# Patient Record
(demographics unavailable — no encounter records)

---

## 2018-03-26 NOTE — CT
HEAD CT WITHOUT CONTRAST:

 

03/26/2018

 

HISTORY:

Altered mental status.

 

COMPARISON:

None.

 

TECHNIQUE:

Serial axial CT imaging at 5 mm intervals, from the vertex through the skull base, without contrast.

 

FINDINGS:

There is no intracranial hemorrhage, midline shift, or mass effect.  There is mild white matter hypod
ensity, which may signify a degree of small vessel disease.  No intracranial hemorrhage, midline shif
t, or mass effect.  There is polypoid mucosal thickening within the alveolar recess of the right maxi
llary sinus.  The imaged paranasal sinuses/mastoid air cells are otherwise unremarkable.  No displace
d calvarial fracture.

 

IMPRESSION:

No acute findings.

 

POS: [facility]

## 2018-03-26 NOTE — RAD
CHEST ONE VIEW:

 

HISTORY:

Altered mental status.

 

COMPARISON:

Chest, one view, 01/17/2016.

 

FINDINGS:

The lungs are hypoinflated.  Bibasilar atelectasis.  No pneumothorax.  The cardiac silhouette and med
iastinal contours are similar.  No acute osseous abnormality.

 

IMPRESSION:

Lung hypoinflation and atelectasis with elevation of the right hemidiaphragm, chronic.  No acute intr
athoracic abnormality.

 

POS: [facility]

## 2018-03-30 NOTE — HP
PRIMARY CARE PHYSICIAN:  Dr. Gloria Colorado 

 

CHIEF COMPLAINT:  Abnormal labs.

 

HISTORY OF PRESENT ILLNESS:  Mr. Ojeda is a pleasant 57-year-old gentleman who was seen at [facility] on 03/30/2018.  The patient is mumbling, unable to provide any history.  
History was obtained from review of medical chart as well as discussion with the emergency room physi
bradley.

 

Mr. Ojeda was at [facility].  He was found to have elevated CK level and was 
therefore transferred to the emergency room.

 

REVIEW OF SYSTEMS:  Review of systems could not be completed due to patient's noncooperation.

 

PAST MEDICAL HISTORY:  Chronic atrial fibrillation, bipolar disorder, hypertension, diabetes mellitus
 type 2, and schizophrenia.

 

PAST SURGICAL HISTORY:  Significant for appendectomy and tonsillectomy.

 

SOCIAL HISTORY:  He is a resident at [facility].  No history of tobacco us
e, alcohol use or recreational drug use.

 

FAMILY HISTORY:  Unable to obtain.

 

ALLERGIES:  No known drug allergies.

 

CURRENT MEDICATIONS:  Amlodipine 10 mg daily, aspirin 325 mg daily, fluoxetine 10 mg daily, glipizide
 5 mg 2 times a day, hydralazine 12.5 mg 3 times a day, lisinopril 30 mg daily, metformin 1000 mg 2 t
imes a day, pravastatin 80 mg daily, Risperdal 0.5 mg 2 times a day, Plavix 75 mg daily, Zyprexa 10 m
g 2 times a day and Klonopin 2 mg 2 times a day.

 

PHYSICAL EXAMINATION:

GENERAL:  Mr. Ojeda is awake and alert, not in acute distress.

VITAL SIGNS:  Blood pressure is 143/98, pulse is 92, his breathing at rate of 18, and saturating 94% 
on 2 liters of oxygen.  He is afebrile.

EYES:  No scleral icterus.  No conjunctival pallor.

ENT:  Moist mucosal membranes, no oropharyngeal erythema or exudates.

NECK:  Supple, nontender, normal range of movement.  Trachea is midline.

RESPIRATORY:  Accessory muscles of breathing are not active.  Chest wall movements are symmetric bila
terally.

LUNGS:  Clear to auscultation without wheeze, rhonchi or crepitations.

CARDIOVASCULAR:  S1 and S2 are heard, regular.  Peripheral pulses palpable.  No carotid bruit, no per
icardial rub.

ABDOMEN:  Soft, nontender, bowel sounds heard, no hepatomegaly, no splenomegaly.

MUSCULOSKELETAL:  Power is 5/5 in all 4 extremities.

NEUROLOGIC:  Cranial nerves II-XII intact.  Deep tendon reflexes are 2+.

SKIN:  No rashes or subcutaneous nodules.

LYMPHATIC:  No cervical lymphadenopathy.

PSYCHIATRIC:  Normal mood, normal affect.  The patient is oriented to person, place, and time.

 

LABORATORY DATA:  Mr. Ojeda's labs and investigations were reviewed.  He had an electrocardiogram
, which showed atrial fibrillation, no ST changes to suggest an acute coronary syndrome.  He has an u
nremarkable CBC, normal sodium, normal potassium, elevated blood urea nitrogen of 28, normal creatini
ne, elevated AST of 55, normal ALT, normal alkaline phosphatase, normal total bilirubin and elevated 
CK of 1618.  Troponin I is negative x3.

 

ASSESSMENT AND PLAN:  Mr. Ojeda is a pleasant 57-year-old gentleman who was seen at [facility] on 03/30/2018.  His problem list includes:

1.  Rhabdomyolysis:  Review of medical records reveals that he has had rhabdomyolysis in the past as 
well.  He does not have any muscular rigidity on examination today.  He will be admitted to the [facility] for intravenous hydration.  We will hold his statin for now and recheck CK level.

2.  Diabetes mellitus.  Start Accu-Cheks, insulin sliding scale.

3.  Atrial fibrillation.  Stable, continue home medications.

4.  Hypertension:  Monitor vital signs, titrate antihypertensives as needed.

5.  Schizophrenia and bipolar disorder.  Continue home medications.

 

Many thanks for allowing me to participate in your patient's care.  Please feel free to contact me wi
th any questions or concerns.

 

LEVEL OF RISK:  Moderate.  

 

LEVEL OF COMPLEXITY:  Moderate.

## 2018-03-31 NOTE — PDOC.PN
- Subjective


Encounter Start Date: 03/31/18


Encounter Start Time: 15:27





Pt seen for followup re: rhabdomyolysis.  Appears comfortable, mumbling, unable 

to complete ROS.





- Objective


MAR Reviewed: Yes


Vital Signs & Weight: 


 Vital Signs (12 hours)











  Temp Pulse Resp BP Pulse Ox


 


 03/31/18 11:22  98.1 F  80  16  96/61  92 L


 


 03/31/18 08:00  98 F  73  18   94 L


 


 03/31/18 07:16  98 F  73  18  123/82  94 L


 


 03/31/18 04:26  98.7 F  71  20  143/78 H  95








 Weight











Weight                         210 lb 1.608 oz














I&O: 


 











 03/30/18 03/31/18 04/01/18





 06:59 06:59 06:59


 


Intake Total  1961 


 


Balance  1961 











Result Diagrams: 


 03/31/18 04:44





 03/31/18 04:44


Additional Labs: 


 Accuchecks











  03/31/18 03/31/18 03/30/18





  11:27 04:45 20:30


 


POC Glucose  78  82  92














  03/30/18 03/30/18





  19:30 17:48


 


POC Glucose  53 L*  93














Phys Exam





- Physical Examination


Constitutional: NAD


HEENT: moist MMs


Neck: supple


Respiratory: clear to auscultation bilateral


Cardiovascular: RRR


Gastrointestinal: soft


Musculoskeletal: no edema


Neurological: moves all 4 limbs


Lymphatic: no nodes


Psychiatric: normal affect


Skin: no rash





Dx/Plan


(1) Rhabdomyolysis


Code(s): M62.82 - RHABDOMYOLYSIS   Status: Acute   Comment: CK improving.  

Continue IV fluids, recheck CK level.   





(2) Atrial fibrillation


Code(s): I48.91 - UNSPECIFIED ATRIAL FIBRILLATION   Status: Chronic   


Qualifiers: 


   Atrial fibrillation type: chronic   Qualified Code(s): I48.2 - Chronic 

atrial fibrillation   


Comment: stable   





(3) Bipolar disorder


Code(s): F31.9 - BIPOLAR DISORDER, UNSPECIFIED   Status: Chronic   Comment: 

continue home medications   





(4) Diabetes type 2, controlled


Code(s): E11.9 - TYPE 2 DIABETES MELLITUS WITHOUT COMPLICATIONS   Status: 

Chronic   Comment: continue accuchecks, insulin sliding scale   





(5) Hypertension


Code(s): I10 - ESSENTIAL (PRIMARY) HYPERTENSION   Status: Chronic   Comment: 

Monitor vital signs, titrate antihypertensives as needed.   





- Plan





* .


Back to [facility] when CK improves, likely 24-48 hrs.





Review of Systems





- Medications/Allergies


Allergies/Adverse Reactions: 


 Allergies











Allergy/AdvReac Type Severity Reaction Status Date / Time


 


No Known Drug Allergies Allergy   Verified 08/25/15 00:36











Medications: 


 Current Medications





Acetaminophen (Tylenol)  650 mg PO Q4H PRN


   PRN Reason: Headache/Fever or Pain


Acetaminophen (Tylenol)  650 mg MD Q4H PRN


   PRN Reason: Headache/Fever or Pain


Amlodipine Besylate (Norvasc)  10 mg PO DAILY [facility]


   Last Admin: 03/31/18 09:06 Dose:  10 mg


Aspirin (Ecotrin)  325 mg PO DAILY [facility]


   Last Admin: 03/31/18 09:07 Dose:  325 mg


Clonazepam (Klonopin)  2 mg PO BID [facility]


   Last Admin: 03/31/18 09:06 Dose:  2 mg


Clopidogrel Bisulfate (Plavix)  75 mg PO DAILY [facility]


   Last Admin: 03/31/18 09:06 Dose:  75 mg


Dextrose/Water (Dextrose 50%)  25 gm SLOW IVP PRN PRN


   PRN Reason: Hypoglycemia


Fluoxetine HCl (Prozac)  10 mg PO DAILY [facility]


   Last Admin: 03/31/18 09:06 Dose:  10 mg


Glipizide (Glucotrol)  5 mg PO BID-AC [facility]


   Last Admin: 03/31/18 09:06 Dose:  5 mg


Glucagon (Glucagon)  1 mg IM PRN PRN


   PRN Reason: Hypoglycemia


Hydralazine HCl (Apresoline)  12.5 mg PO TID [facility]


   Last Admin: 03/31/18 14:44 Dose:  Not Given


Dextrose/Water (D5w)  1,000 mls @ 0 mls/hr IV .Q0M PRN; As Directed


   PRN Reason: Hypoglycemia


Sodium Chloride (Normal Saline 0.9%)  1,000 mls @ 125 mls/hr IV .Q8H [facility]


   Last Admin: 03/31/18 15:03 Dose:  1,000 mls


Insulin Human Lispro (Humalog)  0 units SC .MILD SLIDING SCALE PRN


   PRN Reason: Mild Correctional Scale


Metformin HCl (Glucophage)  1,000 mg PO BID-WM [facility]


   Last Admin: 03/31/18 09:06 Dose:  1,000 mg


Olanzapine (Zyprexa)  10 mg PO BID [facility]


   Last Admin: 03/31/18 09:08 Dose:  10 mg


Risperidone (Risperidone)  0.5 mg PO BID [facility]


   Last Admin: 03/31/18 09:08 Dose:  0.5 mg

## 2018-04-01 NOTE — PDOC.PN
- Subjective


Encounter Start Date: 04/01/18


Encounter Start Time: 09:00


-: old records requested/rev





Patient seen and examined. No new complaints. No overnight events


pt is sleepy this morning, he is trying to get out of bed, pulls iv line





- Objective


MAR Reviewed: Yes


Vital Signs & Weight: 


 Vital Signs (12 hours)











  Temp Pulse Resp BP Pulse Ox


 


 04/01/18 08:43   90   


 


 04/01/18 08:00  97.9 F  90  16  138/82  94 L


 


 04/01/18 03:50  98.1 F  73  16  148/94 H  93 L


 


 04/01/18 00:19      94 L


 


 04/01/18 00:00  98.0 F  82  16  126/85  92 L








 Weight











Weight                         210 lb 1.608 oz














I&O: 


 











 03/31/18 04/01/18 04/02/18





 06:59 06:59 06:59


 


Intake Total 1961 1949 


 


Output Total  600 


 


Balance 1961 1349 











Result Diagrams: 


 04/01/18 03:52





 04/01/18 03:52


Additional Labs: 


 Accuchecks











  04/01/18 03/31/18 03/31/18





  04:34 19:18 17:22


 


POC Glucose  92  116 H  150 H














  03/31/18 03/31/18





  16:02 11:27


 


POC Glucose  64 L  78














Phys Exam





- Physical Examination


Constitutional: NAD


HEENT: PERRLA, moist MMs, sclera anicteric


Neck: no JVD, supple


Respiratory: no wheezing, no rales, no rhonchi


Cardiovascular: RRR, no significant murmur, no rub


Gastrointestinal: soft, non-tender, no distention, positive bowel sounds


Musculoskeletal: no edema, pulses present


Neurological: moves all 4 limbs


Lymphatic: no nodes


Skin: no rash, normal turgor





Dx/Plan


(1) Hypoglycemia associated with type 2 diabetes mellitus


Code(s): E11.649 - TYPE 2 DIABETES MELLITUS WITH HYPOGLYCEMIA WITHOUT COMA   

Status: Acute   





(2) Rhabdomyolysis


Code(s): M62.82 - RHABDOMYOLYSIS   Status: Acute   Comment: CK improving.  

Continue IV fluids, recheck CK level.   





(3) Bipolar disorder


Code(s): F31.9 - BIPOLAR DISORDER, UNSPECIFIED   Status: Chronic   Comment: 

continue home medications   





(4) Diabetes type 2, controlled


Code(s): E11.9 - TYPE 2 DIABETES MELLITUS WITHOUT COMPLICATIONS   Status: 

Chronic   Comment: continue accuchecks, insulin sliding scale   





(5) Hypertension


Code(s): I10 - ESSENTIAL (PRIMARY) HYPERTENSION   Status: Chronic   Comment: 

Monitor vital signs, titrate antihypertensives as needed.   





(6) Lumbar stenosis with neurogenic claudication


Code(s): M48.06 - SPINAL STENOSIS, LUMBAR REGION * DO NOT USE *   Status: 

Chronic   





(7) Obesity (BMI 30-39.9)


Code(s): E66.9 - OBESITY, UNSPECIFIED   Status: Chronic   





(8) Paroxysmal atrial fibrillation


Code(s): I48.0 - PAROXYSMAL ATRIAL FIBRILLATION   Status: Chronic   





- Plan


cont current plan of care, PT/OT, 





* continue ivf at 75 ml per hour


* medication reviewed as below


* symptomatic treatment.


* will need social work for discharge planning








Review of Systems





- Review of Systems


Other: 





unable to review due to his variable level of mood





- Medications/Allergies


Allergies/Adverse Reactions: 


 Allergies











Allergy/AdvReac Type Severity Reaction Status Date / Time


 


No Known Drug Allergies Allergy   Verified 08/25/15 00:36











Medications: 


 Current Medications





Acetaminophen (Tylenol)  650 mg PO Q4H PRN


   PRN Reason: Headache/Fever or Pain


Hydrocodone Bitart/Acetaminophen (Norco 5/325)  1 tab PO Q4H PRN


   PRN Reason: Moderate Pain (4-6)


Al Hydroxide/Mg Hydroxide (Maalox)  15 ml PO Q4H PRN


   PRN Reason: Heartburn  or Indigestion


Amlodipine Besylate (Norvasc)  10 mg PO DAILY [facility]


   Last Admin: 04/01/18 08:42 Dose:  10 mg


Artificial Tears (Tears Naturale)  0 drop EA EYE PRN PRN


   PRN Reason: Dry Eyes


Aspirin (Aspirin)  325 mg PO DAILY [facility]


   Last Admin: 04/01/18 08:42 Dose:  325 mg


Clonazepam (Klonopin)  2 mg PO BID [facility]


   Last Admin: 04/01/18 08:42 Dose:  2 mg


Clopidogrel Bisulfate (Plavix)  75 mg PO DAILY [facility]


   Last Admin: 04/01/18 08:43 Dose:  75 mg


Dextrose/Water (Dextrose 50%)  25 gm SLOW IVP PRN PRN


   PRN Reason: Hypoglycemia


Famotidine (Pepcid)  20 mg PO BID [facility]


   Last Admin: 04/01/18 08:41 Dose:  20 mg


Fluoxetine HCl (Prozac)  10 mg PO DAILY [facility]


   Last Admin: 04/01/18 08:42 Dose:  10 mg


[facility]  [facility]


Glucagon (Glucagon)  1 mg IM PRN PRN


   PRN Reason: Hypoglycemia


Guaifenesin (Robitussin Sf)  200 mg PO Q4H PRN


   PRN Reason: Cough


Hydralazine HCl (Apresoline)  12.5 mg PO TID [facility]


   Last Admin: 04/01/18 08:43 Dose:  12.5 mg


Hydralazine HCl (Apresoline)  20 mg SLOW IVP Q4H PRN


   PRN Reason: Systolic BP > 180


Dextrose/Water (D5w)  1,000 mls @ 0 mls/hr IV .Q0M PRN; As Directed


   PRN Reason: Hypoglycemia


Sodium Chloride (Normal Saline 0.9%)  1,000 mls @ 125 mls/hr IV .Q8H [facility]


   Last Admin: 04/01/18 04:11 Dose:  1,000 mls


Insulin Human Lispro (Humalog)  0 units SC .MILD SLIDING SCALE PRN


   PRN Reason: Mild Correctional Scale


Lisinopril (Zestril)  30 mg PO DAILY [facility]


   Last Admin: 04/01/18 08:41 Dose:  30 mg


Loperamide HCl (Imodium)  2 mg PO PRN PRN


   PRN Reason: Diarrhea/Loose Stools


Loratadine (Claritin)  10 mg PO DAILYPRN PRN


   PRN Reason: Sinus Symptoms


Lorazepam (Ativan)  1 mg PO TID [facility]


   Last Admin: 04/01/18 08:42 Dose:  1 mg


Magnesium Hydroxide (Milk Of Magnesium)  30 ml PO DAILYPRN PRN


   PRN Reason: Constipation


Melatonin (Melatonin)  3 mg PO HS PRN


   PRN Reason: Insomnia


Metformin [facility] (Glucophage)  [facility]


   Last Admin: 04/01/18 08:42 Dose:  1,000 mg


Mineral Oil/White Petrolatum (Eucerin Cream)  0 gm TOP BIDPRN PRN


   PRN Reason: Dry Skin


Olanzapine (Zyprexa)  10 mg PO BID [facility]


   Last Admin: 04/01/18 08:43 Dose:  10 mg


Ondansetron HCl (Zofran Odt)  4 mg PO Q6H PRN


   PRN Reason: Nausea/Vomiting


Ondansetron HCl (Zofran)  4 mg IVP Q6H PRN


   PRN Reason: Nausea/Vomiting


Phenol (Chloraseptic Spray 180 Ml Bot)  0 ml PO PRN PRN


   PRN Reason: Sore Throat


Risperidone (Risperidone)  0.5 mg PO BID [facility]


   Last Admin: 04/01/18 08:42 Dose:  0.5 mg


Senna (Senokot)  2 tab PO HSPRN PRN


   PRN Reason: Constipation


Sodium Chloride (Ocean Nasal Spray 0.65%)  0 ml EA NARE QIDPRN PRN


   PRN Reason: Nasal Congestion


Sodium Chloride (Flush - Normal Saline)  10 ml IVF Q12HR [facility]


   Last Admin: 04/01/18 08:43 Dose:  Not Given


Sodium Chloride (Flush - Normal Saline)  10 ml IVF PRN PRN


   PRN Reason: Saline Flush


Zolpidem Tartrate (Ambien)  5 mg PO HSPRN PRN


   PRN Reason: Insomnia

## 2018-04-02 NOTE — PDOC.PN
- Subjective


Encounter Start Date: 04/02/18


Encounter Start Time: 09:30





Patient seen and examined. No new complaints. No overnight events





- Objective


MAR Reviewed: Yes


Vital Signs & Weight: 


 Vital Signs (12 hours)











  Temp Pulse Resp BP BP Pulse Ox


 


 04/02/18 08:16     137/86  


 


 04/02/18 08:00  98.6 F  77  20   144/84 H  93 L


 


 04/02/18 07:38  98.6 F  77  20   144/84 H  93 L


 


 04/02/18 04:00  97.7 F  88  20   130/85  94 L


 


 04/02/18 00:17  98.3 F  88  20   116/84  95








 Weight











Weight                         210 lb 1.608 oz














I&O: 


 











 04/01/18 04/02/18 04/03/18





 06:59 06:59 06:59


 


Intake Total 1949 1658 


 


Output Total 600  


 


Balance 1349 1658 











Result Diagrams: 


 04/02/18 04:20





 04/02/18 04:20


Additional Labs: 


 Accuchecks











  04/02/18 04/01/18 04/01/18





  04:39 19:22 16:10


 


POC Glucose  100  172 H  112 H














  04/01/18





  11:06


 


POC Glucose  107














Phys Exam





- Physical Examination


Constitutional: NAD


HEENT: PERRLA, moist MMs, sclera anicteric


Neck: no JVD, supple


Respiratory: no wheezing, no rales, no rhonchi


Cardiovascular: RRR, no significant murmur, no rub


Gastrointestinal: soft, non-tender, no distention, positive bowel sounds


Musculoskeletal: no edema, pulses present


Neurological: non-focal, normal sensation


Lymphatic: no nodes


Psychiatric: normal affect


Skin: no rash, normal turgor





Dx/Plan


(1) Hypoglycemia associated with type 2 diabetes mellitus


Code(s): E11.649 - TYPE 2 DIABETES MELLITUS WITH HYPOGLYCEMIA WITHOUT COMA   

Status: Acute   





(2) Rhabdomyolysis


Code(s): M62.82 - RHABDOMYOLYSIS   Status: Acute   Comment: CK improving.  

Continue IV fluids, recheck CK level.   





(3) Bipolar disorder


Code(s): F31.9 - BIPOLAR DISORDER, UNSPECIFIED   Status: Chronic   Comment: 

continue home medications   





(4) Diabetes type 2, controlled


Code(s): E11.9 - TYPE 2 DIABETES MELLITUS WITHOUT COMPLICATIONS   Status: 

Chronic   Comment: continue accuchecks, insulin sliding scale   





(5) Hypertension


Code(s): I10 - ESSENTIAL (PRIMARY) HYPERTENSION   Status: Chronic   Comment: 

Monitor vital signs, titrate antihypertensives as needed.   





(6) Lumbar stenosis with neurogenic claudication


Code(s): M48.06 - SPINAL STENOSIS, LUMBAR REGION * DO NOT USE *   Status: 

Chronic   





(7) Obesity (BMI 30-39.9)


Code(s): E66.9 - OBESITY, UNSPECIFIED   Status: Chronic   





(8) Paroxysmal atrial fibrillation


Code(s): I48.0 - PAROXYSMAL ATRIAL FIBRILLATION   Status: Chronic   





- Plan


cont current plan of care





* CK improved


* today will call [facility]


* needs to go back to psych facility if they take him


* medication reviewed as below


* symptomatic treatment.








Review of Systems





- Review of Systems


Other: 





not reliable with pt as pt is psychotic





- Medications/Allergies


Allergies/Adverse Reactions: 


 Allergies











Allergy/AdvReac Type Severity Reaction Status Date / Time


 


No Known Drug Allergies Allergy   Verified 08/25/15 00:36











Medications: 


 Current Medications





Acetaminophen (Tylenol)  650 mg PO Q4H PRN


   PRN Reason: Headache/Fever or Pain


Hydrocodone Bitart/Acetaminophen (Norco 5/325)  1 tab PO Q4H PRN


   PRN Reason: Moderate Pain (4-6)


Al Hydroxide/Mg Hydroxide (Maalox)  15 ml PO Q4H PRN


   PRN Reason: Heartburn  or Indigestion


Amlodipine Besylate (Norvasc)  10 mg PO DAILY [facility]


   Last Admin: 04/02/18 08:15 Dose:  10 mg


Artificial Tears (Tears Naturale)  0 drop EA EYE PRN PRN


   PRN Reason: Dry Eyes


Aspirin (Aspirin)  325 mg PO DAILY [facility]


   Last Admin: 04/02/18 08:15 Dose:  325 mg


Clonazepam (Klonopin)  2 mg PO BID [facility]


   Last Admin: 04/02/18 08:15 Dose:  2 mg


Clopidogrel Bisulfate (Plavix)  75 mg PO DAILY [facility]


   Last Admin: 04/02/18 08:16 Dose:  75 mg


Dextrose/Water (Dextrose 50%)  25 gm SLOW IVP PRN PRN


   PRN Reason: Hypoglycemia


Famotidine (Pepcid)  20 mg PO BID [facility]


   Last Admin: 04/02/18 08:15 Dose:  20 mg


Fluoxetine HCl (Prozac)  10 mg PO DAILY [facility]


   Last Admin: 04/02/18 08:15 Dose:  10 mg


Glucagon (Glucagon)  1 mg IM PRN PRN


   PRN Reason: Hypoglycemia


Guaifenesin (Robitussin Sf)  200 mg PO Q4H PRN


   PRN Reason: Cough


Hydralazine HCl (Apresoline)  12.5 mg PO TID [facility]


   Last Admin: 04/02/18 08:15 Dose:  12.5 mg


Hydralazine HCl (Apresoline)  20 mg SLOW IVP Q4H PRN


   PRN Reason: Systolic BP > 180


Dextrose/Water (D5w)  1,000 mls @ 0 mls/hr IV .Q0M PRN; As Directed


   PRN Reason: Hypoglycemia


Sodium Chloride (Normal Saline 0.9%)  1,000 mls @ 75 mls/hr IV .[id number] [facility]


   Last Admin: 04/02/18 01:27 Dose:  1,000 mls


Insulin Human Lispro (Humalog)  0 units SC .MILD SLIDING SCALE PRN


   PRN Reason: Mild Correctional Scale


Lisinopril (Zestril)  30 mg PO DAILY [facility]


   Last Admin: 04/02/18 08:16 Dose:  30 mg


Loperamide HCl (Imodium)  2 mg PO PRN PRN


   PRN Reason: Diarrhea/Loose Stools


Loratadine (Claritin)  10 mg PO DAILYPRN PRN


   PRN Reason: Sinus Symptoms


Lorazepam (Ativan)  1 mg PO TID [facility]


   Last Admin: 04/02/18 08:16 Dose:  1 mg


Magnesium Hydroxide (Milk Of Magnesium)  30 ml PO DAILYPRN PRN


   PRN Reason: Constipation


   Last Admin: 04/02/18 08:29 Dose:  30 ml


Melatonin (Melatonin)  3 mg PO HS PRN


   PRN Reason: Insomnia


Mineral Oil/White Petrolatum (Eucerin Cream)  0 gm TOP BIDPRN PRN


   PRN Reason: Dry Skin


Olanzapine (Zyprexa)  10 mg PO BID [facility]


   Last Admin: 04/02/18 08:14 Dose:  10 mg


Ondansetron HCl (Zofran Odt)  4 mg PO Q6H PRN


   PRN Reason: Nausea/Vomiting


Ondansetron HCl (Zofran)  4 mg IVP Q6H PRN


   PRN Reason: Nausea/Vomiting


Phenol (Chloraseptic Spray 180 Ml Bot)  0 ml PO PRN PRN


   PRN Reason: Sore Throat


Risperidone (Risperidone)  0.5 mg PO BID [facility]


   Last Admin: 04/02/18 08:16 Dose:  0.5 mg


Senna (Senokot)  2 tab PO HSPRN PRN


   PRN Reason: Constipation


Sodium Chloride (Ocean Nasal Spray 0.65%)  0 ml EA NARE QIDPRN PRN


   PRN Reason: Nasal Congestion


Sodium Chloride (Flush - Normal Saline)  10 ml IVF Q12HR [facility]


   Last Admin: 04/02/18 08:16 Dose:  Not Given


Sodium Chloride (Flush - Normal Saline)  10 ml IVF PRN PRN


   PRN Reason: Saline Flush


Zolpidem Tartrate (Ambien)  5 mg PO HSPRN PRN


   PRN Reason: Insomnia

## 2018-04-03 NOTE — PDOC.PN
- Subjective


Encounter Start Date: 04/03/18


Encounter Start Time: 08:00





Patient seen and examined. No new complaints. No overnight events





- Objective


MAR Reviewed: Yes


Vital Signs & Weight: 


 Vital Signs (12 hours)











  Temp Pulse Resp BP BP Pulse Ox


 


 04/03/18 09:12     144/103 H  


 


 04/03/18 09:11   72   144/103 H  


 


 04/03/18 09:10   72   144/103 H  


 


 04/03/18 08:00  97.4 F L  72  18    91 L


 


 04/03/18 07:57  97.4 F L  72  18   144/103 H  91 L


 


 04/03/18 04:00  97.8 F  75  20   133/98 H  93 L








 Weight











Weight                         210 lb 1.608 oz














I&O: 


 











 04/02/18 04/03/18 04/04/18





 06:59 06:59 06:59


 


Intake Total 1658 650 


 


Balance 1658 650 











Result Diagrams: 


 04/02/18 04:20





 04/02/18 04:20


Additional Labs: 


 Accuchecks











  04/03/18 04/03/18 04/02/18





  11:10 04:21 19:51


 


POC Glucose  109  104  155 H














  04/02/18 04/02/18





  16:45 12:18


 


POC Glucose  91  95














Phys Exam





- Physical Examination


Constitutional: NAD


HEENT: PERRLA, moist MMs, sclera anicteric


Neck: no JVD, supple


Respiratory: no wheezing, no rales, no rhonchi


Cardiovascular: RRR, no significant murmur, no rub


Gastrointestinal: soft, non-tender, no distention, positive bowel sounds


Musculoskeletal: no edema, pulses present


Neurological: non-focal, normal sensation, moves all 4 limbs


Lymphatic: no nodes


Psychiatric: normal affect


Skin: no rash, normal turgor





Dx/Plan


(1) Hypoglycemia associated with type 2 diabetes mellitus


Code(s): E11.649 - TYPE 2 DIABETES MELLITUS WITH HYPOGLYCEMIA WITHOUT COMA   

Status: Resolved   





(2) Rhabdomyolysis


Code(s): M62.82 - RHABDOMYOLYSIS   Status: Resolved   Comment: CK improving.  

Continue IV fluids, recheck CK level.   





(3) Bipolar disorder


Code(s): F31.9 - BIPOLAR DISORDER, UNSPECIFIED   Status: Chronic   Comment: 

continue home medications   





(4) Diabetes type 2, controlled


Code(s): E11.9 - TYPE 2 DIABETES MELLITUS WITHOUT COMPLICATIONS   Status: 

Chronic   Comment: continue accuchecks, insulin sliding scale   





(5) Hypertension


Code(s): I10 - ESSENTIAL (PRIMARY) HYPERTENSION   Status: Chronic   Comment: 

Monitor vital signs, titrate antihypertensives as needed.   





(6) Lumbar stenosis with neurogenic claudication


Code(s): M48.06 - SPINAL STENOSIS, LUMBAR REGION * DO NOT USE *   Status: 

Chronic   





(7) Obesity (BMI 30-39.9)


Code(s): E66.9 - OBESITY, UNSPECIFIED   Status: Chronic   





(8) Paroxysmal atrial fibrillation


Code(s): I48.0 - PAROXYSMAL ATRIAL FIBRILLATION   Status: Chronic   





- Plan


cont current plan of care, 





* pt is medically stable for discharge


* he is waiting for [facility] as he needs that and he is psychotic


* medication reviewed as below


* symptomatic treatment


* once [facility] available, will discharge to home.








Review of Systems





- Review of Systems


Other: 





not reliable due to his cognitive status





- Medications/Allergies


Allergies/Adverse Reactions: 


 Allergies











Allergy/AdvReac Type Severity Reaction Status Date / Time


 


No Known Drug Allergies Allergy   Verified 08/25/15 00:36











Medications: 


 Current Medications





Acetaminophen (Tylenol)  650 mg PO Q4H PRN


   PRN Reason: Headache/Fever or Pain


   Last Admin: 04/02/18 12:49 Dose:  650 mg


Hydrocodone Bitart/Acetaminophen (Norco 5/325)  1 tab PO Q4H PRN


   PRN Reason: Moderate Pain (4-6)


Al Hydroxide/Mg Hydroxide (Maalox)  15 ml PO Q4H PRN


   PRN Reason: Heartburn  or Indigestion


Amlodipine Besylate (Norvasc)  10 mg PO DAILY [facility]


   Last Admin: 04/03/18 09:10 Dose:  10 mg


Artificial Tears (Tears Naturale)  0 drop EA EYE PRN PRN


   PRN Reason: Dry Eyes


Aspirin (Aspirin)  325 mg PO DAILY [facility]


   Last Admin: 04/03/18 09:10 Dose:  325 mg


Clonazepam (Klonopin)  2 mg PO BID [facility]


   Last Admin: 04/03/18 09:10 Dose:  2 mg


Clopidogrel Bisulfate (Plavix)  75 mg PO DAILY [facility]


   Last Admin: 04/03/18 09:14 Dose:  75 mg


Dextrose/Water (Dextrose 50%)  25 gm SLOW IVP PRN PRN


   PRN Reason: Hypoglycemia


Famotidine (Pepcid)  20 mg PO BID [facility]


   Last Admin: 04/03/18 09:11 Dose:  20 mg


Fluoxetine HCl (Prozac)  10 mg PO DAILY [facility]


   Last Admin: 04/03/18 09:11 Dose:  10 mg


Glucagon (Glucagon)  1 mg IM PRN PRN


   PRN Reason: Hypoglycemia


Guaifenesin (Robitussin Sf)  200 mg PO Q4H PRN


   PRN Reason: Cough


Hydralazine HCl (Apresoline)  12.5 mg PO TID [facility]


   Last Admin: 04/03/18 09:11 Dose:  12.5 mg


Hydralazine HCl (Apresoline)  20 mg SLOW IVP Q4H PRN


   PRN Reason: Systolic BP > 180


Dextrose/Water (D5w)  1,000 mls @ 0 mls/hr IV .Q0M PRN; As Directed


   PRN Reason: Hypoglycemia


Insulin Human Lispro (Humalog)  0 units SC .MILD SLIDING SCALE PRN


   PRN Reason: Mild Correctional Scale


Lisinopril (Zestril)  30 mg PO DAILY [facility]


   Last Admin: 04/03/18 09:12 Dose:  30 mg


Loperamide HCl (Imodium)  2 mg PO PRN PRN


   PRN Reason: Diarrhea/Loose Stools


Loratadine (Claritin)  10 mg PO DAILYPRN PRN


   PRN Reason: Sinus Symptoms


Lorazepam (Ativan)  1 mg PO TID [facility]


   Last Admin: 04/03/18 09:13 Dose:  1 mg


Magnesium Hydroxide (Milk Of Magnesium)  30 ml PO DAILYPRN PRN


   PRN Reason: Constipation


   Last Admin: 04/02/18 08:29 Dose:  30 ml


Melatonin (Melatonin)  3 mg PO HS PRN


   PRN Reason: Insomnia


Mineral Oil/White Petrolatum (Eucerin Cream)  0 gm TOP BIDPRN PRN


   PRN Reason: Dry Skin


Olanzapine (Zyprexa)  10 mg PO BID [facility]


   Last Admin: 04/03/18 09:14 Dose:  10 mg


Ondansetron HCl (Zofran Odt)  4 mg PO Q6H PRN


   PRN Reason: Nausea/Vomiting


Ondansetron HCl (Zofran)  4 mg IVP Q6H PRN


   PRN Reason: Nausea/Vomiting


Phenol (Chloraseptic Spray 180 Ml Bot)  0 ml PO PRN PRN


   PRN Reason: Sore Throat


Risperidone (Risperidone)  0.5 mg PO BID [facility]


   Last Admin: 04/03/18 09:58 Dose:  0.5 mg


Senna (Senokot)  2 tab PO HSPRN PRN


   PRN Reason: Constipation


Sodium Chloride (Ocean Nasal Spray 0.65%)  0 ml EA NARE QIDPRN PRN


   PRN Reason: Nasal Congestion


Sodium Chloride (Flush - Normal Saline)  10 ml IVF Q12HR [facility]


   Last Admin: 04/03/18 09:15 Dose:  10 ml


Sodium Chloride (Flush - Normal Saline)  10 ml IVF PRN PRN


   PRN Reason: Saline Flush


Zolpidem Tartrate (Ambien)  5 mg PO HSPRN PRN


   PRN Reason: Insomnia

## 2018-04-03 NOTE — PDOC.EVN
Event Note





- Event Note


Event Note: 





Bed free at the inpatient psychiatri c facility, report calleed to Dr Alexandre, pt 

accepted.  Transferred to [facility]





D/C summary per Dr Figueroa

## 2018-04-04 NOTE — PDOC.PN
- Subjective


Encounter Start Date: 04/04/18


Encounter Start Time: 06:00





Patient seen and examined. No new complaints. No overnight events





- Objective


MAR Reviewed: Yes


Vital Signs & Weight: 


 Vital Signs (12 hours)











  Temp Pulse Resp BP BP Pulse Ox


 


 04/04/18 04:30  97.7 F  89  20   142/90 H  95


 


 04/04/18 00:52  97.6 F  69  18   123/90  94 L


 


 04/03/18 20:48   79   137/91 H  


 


 04/03/18 20:00  98.1 F  77  20   137/91 H  94 L








 Weight











Weight                         210 lb 1.608 oz














I&O: 


 











 04/02/18 04/03/18 04/04/18





 06:59 06:59 06:59


 


Intake Total 7328 [id number] [id number]


 


Balance 8204 824 3976











Result Diagrams: 


 04/02/18 04:20





 04/02/18 04:20


Additional Labs: 


 Accuchecks











  04/04/18 04/03/18 04/03/18





  05:44 19:32 15:55


 


POC Glucose  146 H  147 H  91














  04/03/18





  11:10


 


POC Glucose  109














Phys Exam





- Physical Examination


Constitutional: NAD


HEENT: PERRLA, moist MMs, sclera anicteric


Neck: no JVD, supple


Respiratory: no wheezing, no rales, no rhonchi


Cardiovascular: RRR, no significant murmur, no rub


Gastrointestinal: soft, non-tender, no distention, positive bowel sounds


Musculoskeletal: no edema, pulses present


Neurological: non-focal, normal sensation


Lymphatic: no nodes


Psychiatric: normal affect


Skin: no rash, normal turgor





Dx/Plan


(1) Hypoglycemia associated with type 2 diabetes mellitus


Code(s): E11.649 - TYPE 2 DIABETES MELLITUS WITH HYPOGLYCEMIA WITHOUT COMA   

Status: Resolved   





(2) Rhabdomyolysis


Code(s): M62.82 - RHABDOMYOLYSIS   Status: Resolved   Comment: CK improving.  

Continue IV fluids, recheck CK level.   





(3) Bipolar disorder


Code(s): F31.9 - BIPOLAR DISORDER, UNSPECIFIED   Status: Chronic   Comment: 

continue home medications   





(4) Diabetes type 2, controlled


Code(s): E11.9 - TYPE 2 DIABETES MELLITUS WITHOUT COMPLICATIONS   Status: 

Chronic   Comment: continue accuchecks, insulin sliding scale   





(5) Hypertension


Code(s): I10 - ESSENTIAL (PRIMARY) HYPERTENSION   Status: Chronic   Comment: 

Monitor vital signs, titrate antihypertensives as needed.   





(6) Lumbar stenosis with neurogenic claudication


Code(s): M48.06 - SPINAL STENOSIS, LUMBAR REGION * DO NOT USE *   Status: 

Chronic   





(7) Obesity (BMI 30-39.9)


Code(s): E66.9 - OBESITY, UNSPECIFIED   Status: Chronic   





(8) Paroxysmal atrial fibrillation


Code(s): I48.0 - PAROXYSMAL ATRIAL FIBRILLATION   Status: Chronic   





- Plan


cont current plan of care





* medication reviewed as below


* symptomatic treatment


* plan for discharge to psych facility.








Review of Systems





- Review of Systems


Eyes: negative: Pain, Vision Change, Conjunctivae Inflammation, Eyelid 

Inflammation, Redness, Other


ENT: negative: Ear Pain, Ear Discharge, Nose Pain, Nose Discharge, Nose 

Congestion, Mouth Pain, Mouth Swelling, Throat Pain, Throat Swelling, Other


Respiratory: negative: Cough, Dry, Shortness of Breath, Hemoptysis, SOB with 

Excertion, Pleuritic Pain, Sputum, Wheezing


Cardiovascular: negative: chest pain, palpitations, orthopnea, paroxysmal 

nocturnal dyspnea, edema, light headedness, other


Gastrointestinal: negative: Nausea, Vomiting, Abdominal Pain, Diarrhea, 

Constipation, Melena, Hematochezia, Other


Genitourinary: negative: Dysuria, Frequency, Incontinence, Hematuria, Retention

, Other


Musculoskeletal: negative: Neck Pain, Shoulder Pain, Arm Pain, Back Pain, Hand 

Pain, Leg Pain, Foot Pain, Other





- Medications/Allergies


Allergies/Adverse Reactions: 


 Allergies











Allergy/AdvReac Type Severity Reaction Status Date / Time


 


No Known Drug Allergies Allergy   Verified 08/25/15 00:36











Medications: 


 Current Medications





Acetaminophen (Tylenol)  650 mg PO Q4H PRN


   PRN Reason: Headache/Fever or Pain


   Last Admin: 04/02/18 12:49 Dose:  650 mg


Hydrocodone Bitart/Acetaminophen (Norco 5/325)  1 tab PO Q4H PRN


   PRN Reason: Moderate Pain (4-6)


Al Hydroxide/Mg Hydroxide (Maalox)  15 ml PO Q4H PRN


   PRN Reason: Heartburn  or Indigestion


Amlodipine Besylate (Norvasc)  10 mg PO DAILY [facility]


   Last Admin: 04/03/18 09:10 Dose:  10 mg


Artificial Tears (Tears Naturale)  0 drop EA EYE PRN PRN


   PRN Reason: Dry Eyes


Aspirin (Aspirin)  325 mg PO DAILY [facility]


   Last Admin: 04/03/18 09:10 Dose:  325 mg


Clonazepam (Klonopin)  2 mg PO BID [facility]


   Last Admin: 04/03/18 20:48 Dose:  2 mg


Clopidogrel Bisulfate (Plavix)  75 mg PO DAILY [facility]


   Last Admin: 04/03/18 09:14 Dose:  75 mg


Dextrose/Water (Dextrose 50%)  25 gm SLOW IVP PRN PRN


   PRN Reason: Hypoglycemia


Famotidine (Pepcid)  20 mg PO BID [facility]


   Last Admin: 04/03/18 20:49 Dose:  20 mg


Fluoxetine HCl (Prozac)  10 mg PO DAILY [facility]


   Last Admin: 04/03/18 09:11 Dose:  10 mg


Glucagon (Glucagon)  1 mg IM PRN PRN


   PRN Reason: Hypoglycemia


Guaifenesin (Robitussin Sf)  200 mg PO Q4H PRN


   PRN Reason: Cough


Hydralazine HCl (Apresoline)  12.5 mg PO TID [facility]


   Last Admin: 04/03/18 20:48 Dose:  12.5 mg


Hydralazine HCl (Apresoline)  20 mg SLOW IVP Q4H PRN


   PRN Reason: Systolic BP > 180


Dextrose/Water (D5w)  1,000 mls @ 0 mls/hr IV .Q0M PRN; As Directed


   PRN Reason: Hypoglycemia


Insulin Human Lispro (Humalog)  0 units SC .MILD SLIDING SCALE PRN


   PRN Reason: Mild Correctional Scale


Lisinopril (Zestril)  30 mg PO DAILY [facility]


   Last Admin: 04/03/18 09:12 Dose:  30 mg


Loperamide HCl (Imodium)  2 mg PO PRN PRN


   PRN Reason: Diarrhea/Loose Stools


Loratadine (Claritin)  10 mg PO DAILYPRN PRN


   PRN Reason: Sinus Symptoms


Lorazepam (Ativan)  1 mg PO TID [facility]


   Last Admin: 04/03/18 20:49 Dose:  1 mg


Magnesium Hydroxide (Milk Of Magnesium)  30 ml PO DAILYPRN PRN


   PRN Reason: Constipation


   Last Admin: 04/02/18 08:29 Dose:  30 ml


Melatonin (Melatonin)  3 mg PO HS PRN


   PRN Reason: Insomnia


Mineral Oil/White Petrolatum (Eucerin Cream)  0 gm TOP BIDPRN PRN


   PRN Reason: Dry Skin


Olanzapine (Zyprexa)  10 mg PO BID [facility]


   Last Admin: 04/03/18 22:06 Dose:  10 mg


Ondansetron HCl (Zofran Odt)  4 mg PO Q6H PRN


   PRN Reason: Nausea/Vomiting


Ondansetron HCl (Zofran)  4 mg IVP Q6H PRN


   PRN Reason: Nausea/Vomiting


Phenol (Chloraseptic Spray 180 Ml Bot)  0 ml PO PRN PRN


   PRN Reason: Sore Throat


Risperidone (Risperidone)  0.5 mg PO BID [facility]


   Last Admin: 04/03/18 22:05 Dose:  0.5 mg


Senna (Senokot)  2 tab PO HSPRN PRN


   PRN Reason: Constipation


Sodium Chloride (Ocean Nasal Spray 0.65%)  0 ml EA NARE QIDPRN PRN


   PRN Reason: Nasal Congestion


Sodium Chloride (Flush - Normal Saline)  10 ml IVF Q12HR [facility]


   Last Admin: 04/03/18 20:54 Dose:  10 ml


Sodium Chloride (Flush - Normal Saline)  10 ml IVF PRN PRN


   PRN Reason: Saline Flush


Zolpidem Tartrate (Ambien)  5 mg PO HSPRN PRN


   PRN Reason: Insomnia

## 2018-04-07 NOTE — EKG
Test Reason : 

Blood Pressure : ***/*** mmHG

Vent. Rate : 097 BPM     Atrial Rate : 053 BPM

   P-R Int : 000 ms          QRS Dur : 082 ms

    QT Int : 340 ms       P-R-T Axes : 000 037 043 degrees

   QTc Int : 431 ms

 

Atrial fibrillation

RSR' or QR pattern in V1 suggests right ventricular conduction delay

Septal infarct , age undetermined

Abnormal ECG

 

Confirmed by MONIE ADAM, ELIJAH ([id number]),  JONI HALLMAN ([id number]) on 4/7/2018 10:58:05 PM

 

Referred By:             Confirmed By:ELIJAH LOMAX MD

## 2018-04-14 NOTE — RAD
SINGLE VIEW OF THE CHEST:

4/14/18

 

COMPARISON:  

3/26/18

 

HISTORY: 

Altered mental status with abdominal pain.

 

FINDINGS:

Single view of the chest shows a normal sized cardiomediastinal silhouette. There is no evidence of c
onsolidation, mass, or pleural effusion. The bones are unremarkable.

 

IMPRESSION:

No evidence of acute cardiopulmonary disease.

 

POS: [facility]

## 2018-04-14 NOTE — CT
CT OF THE BRAIN WITHOUT CONTRAST:

4/14/18

 

COMPARISON:  

None.

 

HISTORY: 

Altered mental status. History of schizophrenia and bipolar disorder.

 

TECHNIQUE:  

Multiple contiguous axial images were obtained in a CT of the brain without contrast. 

 

FINDINGS:  

The brain is normal in morphology and attenuation without focal lesions or confluent areas of infarct
ion. There is no evidence of hydrocephalus, intracranial hemorrhage or extra-axial fluid collection. 


 

The calvarium and overlying soft tissues are unremarkable. The visualized paranasal sinuses and masto
id air cells are well aerated. 

 

IMPRESSION:  

No evidence of acute intracranial abnormality. 

 

POS: [facility]

## 2018-04-14 NOTE — CT
CT OF THE ABDOMEN AND PELVIS WITH CONTRAST:

4/14/18

 

COMPARISON:  

None.

 

HISTORY: 

Right sided abdominal pain. Patient has schizophrenic and bipolar disorder.

 

TECHNIQUE:  

Multiple contiguous axial images were obtained in a CT of the abdomen and pelvis with contrast. Coron
al reformats were performed. 

 

FINDINGS:  

There is a 4.4 cm cyst in the left kidney. The liver, gallbladder, right kidney, adrenal glands, sple
en, and pancreas are unremarkable. No free air, free fluid or stranding changes are seen in the abdom
en or pelvis. 

 

The large and small bowel are unremarkable. The appendix is normal. No abdominal or pelvic lymphadeno
sita are seen. Degenerative changes are seen in the spine. The visualized inferior thorax and abdomi
nal wall soft tissues are unremarkable. 

 

IMPRESSION:  

1.      No evidence of acute intra-abdominal/pelvic abnormality. 

2.      Left renal cyst. 

 

POS: [facility]

## 2018-04-14 NOTE — HP
PRIMARY CARE PHYSICIAN:  City call admission.

 

REASON FOR ADMISSION:  Transfer from [facility] for rhabdomyolysis.

 

HISTORY OF PRESENT ILLNESS:  A 57-year-old  male who has underlying schizophrenia, who was re
cently admitted in our hospital on 03/30/2018 and he was discharged to [facility] on 04/04/2018.  During that admission, we treated him for rhabdomyolysis.  Unfortunately, thi
s patient is schizophrenic and he is talking irrelevantly and he is not able to provide any reliable 
history, so history from him is not obtained.

 

At [facility], they checked total CK, it was elevated.  In the emergency r
oom, the patient was given IV fluid.  The patient's renal function was normal.  The patient was tried
 to send him back to [facility], but they refused to take him and that is 
why patient is being admitted in our hospital for observation.

 

REVIEW OF SYSTEMS:  All review of systems tried to review with the patient, but unable to review cari
use of his cognitive status, underlying schizophrenia and he is completely unreliable.

 

ALLERGIES:  No known drug allergy.

 

CURRENT HOME MEDICATIONS:  Amlodipine 10 mg p.o. daily, glipizide 5 mg p.o. b.i.d., lisinopril 30 mg 
p.o. daily, metformin 1000 mg p.o. b.i.d., risperidone 0.5 mg twice daily, Plavix 75 mg p.o. daily, D
epakote 250 mg p.o. b.i.d., Pepcid 20 mg twice daily, Remeron 15 mg p.o. daily, perphenazine 2 mg twi
ce daily.

 

PAST MEDICAL HISTORY:  Chronic atrial fibrillation, hypertension, diabetes type 2.

 

PAST PSYCHIATRIC HISTORY:  Bipolar disorder, schizophrenia.

 

PAST SURGICAL HISTORY:  Appendicectomy, tonsillectomy.

 

SOCIAL HISTORY:  The patient is transferred from [facility].  No history o
f tobacco, alcohol or illicit drug abuse.

 

FAMILY HISTORY:  No strong family history of premature coronary artery disease, stroke or cancer as p
er our medical record.

 

EMERGENCY ROOM COURSE:  The patient has received IV fluid 3 liter.

 

PHYSICAL EXAMINATION:

VITAL SIGNS:  On arrival, blood pressure 118/80, pulse 83 irregular, respiratory rate 17, temperature
 99.3, saturation 99% on room air, weight 90.7 kilograms.

GENERAL:  The patient is currently awake, talking irrelevantly, confused, psychotic.

HEAD:  Normocephalic, atraumatic.

EYES:  Pupils are round, reactive to light.  Extraocular muscle intact.

ENT:  Oropharynx within normal limits.  Moist mucous membranes.  No oral lesion, no pharyngeal erythe
ma, no exudate.

NECK:  Supple, no JVD, no thyromegaly, no carotid bruit, no jugular venous distention.

LUNGS:  Clear to auscultation without any rhonchi or rales.

CARDIAC:  S1, S2 irregularly irregular.  No murmur elicited, no gallop, no rub.

ABDOMEN:  Soft, bowel sounds present, nontender, nondistended.  No organomegaly, no mass, no suprapub
ic tenderness.

BACK:  Unremarkable, no CVA tenderness.

EXTREMITIES:  Upper extremity:  Passive movement of all joints are normal.  Lower extremity:  Trace p
edal edema noted.  No calf tenderness.  Good distal pulsation.

SKIN:  No skin rash.

HEMATOLOGICAL SYSTEM:  No lymphadenopathy.

PSYCHIATRIC:  The patient is psychotic at this point, talking irrelevantly and incoherently.

NEUROLOGIC:  No focal neurological deficit noted.  He moves all 4 limbs.  Detailed neurological exami
[facility] is not possible because of uncooperativeness.

 

SIGNIFICANT LABORATORY DATA:  EKG showing atrial fibrillation with controlled ventricular response.  
CT brain based on my review, no acute intracranial process.  CT of the abdomen and pelvis showed no e
vidence of intra-abdominal process.  Chest x-ray based on my review, no acute cardiopulmonary process
.  CBC:  WBC 7.4, hemoglobin 13.9, platelet 229.  BMP:  Sodium 140, potassium 4.0, chloride 106, carb
on dioxide 22, anion gap 16, BUN 20, creatinine 0.98, glucose 106, calcium 9.5.  LFT:  AST 65, ALT 57
, alkaline phosphatase 92, albumin 4.0.  CK 1783, CK-MB 9.1, troponin I less than 0.010.  Ammonia lev
el 40.  Lactic acid 3.3 and then 1.8.  Urinalysis unremarkable.  Urine drug screen negative.

 

ASSESSMENT AND PLAN:

IMPRESSION:

1.  Rhabdomyolysis.  Looking at his old record, the patient has fluctuating level of total CK, suspec
ting from his psychiatric medication, contributing to elevated total CK.  The patient's renal functio
n is normal.  The patient has received IV fluid.  The patient is currently asymptomatic from this per
spective and because of total CK elevated and that is why suspecting his LFT abnormal as well as CK-M
B is elevated.

2.  Chronic atrial fibrillation, rate controlled.  The patient is not a candidate for chronic anticoa
gulation therapy because of his cognitive status.  He will benefit from only aspirin therapy and his 
CHADS2 score is also very low.

3.  Diabetes, type 2.  We will continue glipizide 5 mg p.o. b.i.d., metformin 1000 mg p.o. b.i.d. and
 insulin as per sliding scale per protocol.

4.  Hypertension.  We will continue lisinopril 30 mg p.o. daily, amlodipine 10 mg p.o. daily.

5.  Anxiety, depression, and bipolar disorder.  At this point, we will continue patient medication, D
epakote 250 mg twice daily, risperidone 0.5 mg twice daily and perphenazine 2 mg twice daily, and Rem
brian 15 mg p.o. daily.

6.  Obesity.  Dietary education given.

7.  Lactic acidosis, may be related with metformin, but currently is improved.  He does not have any 
source of infection.

8.  Transaminitis, likely related with rhabdomyolysis.  We will check his hepatitis profile tomorrow 
morning.

9.  Deep venous thrombosis prophylaxis, Lovenox 40 mg subcu daily.

10.  Gastrointestinal prophylaxis, Pepcid 20 mg p.o. b.i.d.

11.  Code status:  The patient is FULL CODE.  The patient does not have any surrogate decision maker.


 

Disposition plan based on clinical course.  The patient will need [facility] evaluation and again psychiatr
ic placement.

## 2018-04-15 NOTE — PDOC.PN
- Subjective


Encounter Start Date: 04/15/18


Encounter Start Time: 07:40





Pt seen for followup re: rhabdomyolysis.  Mumbling, unable to answer questions, 

unable to complete ROS.





- Objective


Resuscitation Status: 


 











Resuscitation Status           FULL:Full Resuscitation














MAR Reviewed: Yes


Vital Signs & Weight: 


 Vital Signs (12 hours)











  Temp Pulse Resp BP Pulse Ox


 


 04/15/18 04:00  98.1 F  73  17  103/65  96


 


 04/15/18 02:49      99











I&O: 


 











 04/14/18 04/15/18 04/16/18





 06:59 06:59 06:59


 


Intake Total  375 


 


Output Total  300 


 


Balance  75 











Result Diagrams: 


 04/15/18 04:53





 04/15/18 04:53


Additional Labs: 


 Accuchecks











  04/15/18





  06:27


 


POC Glucose  95











EKG Reviewed by me: Yes (Tele:  demetris frank)





Phys Exam





- Physical Examination


Obese


HEENT: PERRLA, moist MMs, sclera anicteric, oral pharynx no lesions


Neck: no nodes, no JVD, supple, full ROM


Respiratory: no wheezing, no rales, no rhonchi, clear to auscultation bilateral


Cardiovascular: no rub, irregular


Gastrointestinal: soft, non-tender, no distention, positive bowel sounds


Neurological: moves all 4 limbs


Psychiatric: normal affect





Dx/Plan


(1) Rhabdomyolysis


Code(s): M62.82 - RHABDOMYOLYSIS   Status: Acute   Comment: Continue IV fluids, 

follow CK levels.  Etiology unclear at this time.   





(2) Diabetes type 2, controlled


Code(s): E11.9 - TYPE 2 DIABETES MELLITUS WITHOUT COMPLICATIONS   Status: 

Chronic   Comment: On accuchecks, insulin sliding scale   





(3) Hypertension


Code(s): I10 - ESSENTIAL (PRIMARY) HYPERTENSION   Status: Chronic   Comment: 

Monitor vital signs, titrate antihypertensives as needed.   





(4) Obesity (BMI 30-39.9)


Code(s): E66.9 - OBESITY, UNSPECIFIED   Status: Chronic   Comment: stable   





(5) Paroxysmal atrial fibrillation


Code(s): I48.0 - PAROXYSMAL ATRIAL FIBRILLATION   Status: Chronic   Comment: 

Rate-controlled.   





(6) Bipolar disorder


Code(s): F31.9 - BIPOLAR DISORDER, UNSPECIFIED   Status: Chronic   Comment: 

Resume home medications   





- Plan





* .


Discontinue telemetry monitor, transfer to medical floor.





Review of Systems





- Medications/Allergies


Allergies/Adverse Reactions: 


 Allergies











Allergy/AdvReac Type Severity Reaction Status Date / Time


 


No Known Drug Allergies Allergy   Verified 08/25/15 00:36











Medications: 


 Current Medications





Acetaminophen (Tylenol)  650 mg PO Q4H PRN


   PRN Reason: Headache/Fever or Pain


   Last Admin: 04/15/18 09:21 Dose:  650 mg


Acetaminophen (Tylenol Er (8hr Arthritis Pain))  650 mg PO Q4H PRN


   PRN Reason: Pain


Hydrocodone Bitart/Acetaminophen (Norco 5/325)  1 tab PO Q4H PRN


   PRN Reason: Moderate Pain (4-6)


Al Hydroxide/Mg Hydroxide (Maalox)  30 ml PO Q6H PRN


   PRN Reason: Heartburn  or Indigestion


Al Hydroxide/Mg Hydroxide (Maalox Plus)  30 ml PO Q4HR PRN


   PRN Reason: GI Upset


Amlodipine Besylate (Norvasc)  10 mg PO DAILY [facility]


Clonidine (Catapres)  0.1 mg PO TID PRN


   PRN Reason: Hypertension


Clopidogrel Bisulfate (Plavix)  75 mg PO DAILY [facility]


Dextrose/Water (Dextrose 50%)  25 gm SLOW IVP PRN PRN


   PRN Reason: Hypoglycemia


Divalproex Sodium (Depakote Er)  500 mg PO HS [facility]


Divalproex Sodium (Depakote Er)  250 mg PO DAILY [facility]


Docusate Sodium (Colace)  100 mg PO DAILY PRN


   PRN Reason: Constipation


Enoxaparin Sodium (Lovenox)  40 mg SC 0900 [facility]


   Last Admin: 04/15/18 09:21 Dose:  40 mg


Famotidine (Pepcid)  20 mg PO BID [facility]


   Last Admin: 04/15/18 09:21 Dose:  20 mg


Famotidine (Pepcid)  20 mg PO BID [facility]


Fluoxetine HCl (Prozac)  10 mg PO DAILY [facility]


Glipizide (Glucotrol)  5 mg PO DAILY [facility]


Glucagon (Glucagon)  1 mg IM PRN PRN


   PRN Reason: Hypoglycemia


Hydroxyzine Pamoate (Vistaril)  50 mg PO HS PRN


   PRN Reason: Insomnia


Dextrose/Water (D5w)  1,000 mls @ 0 mls/hr IV .Q0M PRN; As Directed


   PRN Reason: Hypoglycemia


Sodium Chloride (Normal Saline 0.9%)  1,000 mls @ 75 mls/hr IV .[id number] [facility]


   Last Admin: 04/15/18 01:39 Dose:  1,000 mls


Insulin Human Lispro (Humalog)  0 units SC .MODERATE SLIDING SC PRN


   PRN Reason: Moderate Correctional Scale


Insulin Human Lispro (Humalog)  0 units SC .BEDTIME SLIDING SC PRN


   PRN Reason: Bedtime Correctional Scale


Lisinopril (Zestril)  30 mg PO DAILY [facility]


Loperamide HCl (Imodium)  2 mg PO PRN PRN


   PRN Reason: Diarrhea/Loose Stools


Loratadine (Claritin)  10 mg PO DAILY [facility]


Magnesium Hydroxide (Milk Of Magnesium)  30 ml PO DAILYPRN PRN


   PRN Reason: Constipation


Magnesium Hydroxide (Milk Of Magnesium)  30 ml PO Q4H PRN


   PRN Reason: Constipation


Metformin HCl (Glucophage)  1,000 mg PO BID [facility]


Mirtazapine (Remeron)  15 mg PO HS [facility]


Non-Formulary Medication (Risperidone [Risperdal])  0.5 mg PO DAILY [facility]


Ondansetron HCl (Zofran Odt)  4 mg PO Q6H PRN


   PRN Reason: Nausea/Vomiting


Ondansetron HCl (Zofran)  4 mg IVP Q6H PRN


   PRN Reason: Nausea/Vomiting


Perphenazine (Trilafon)  2 mg PO BID [facility]


Risperidone (Risperidone)  1 mg PO HS [facility]


Senna (Senokot)  2 tab PO HSPRN PRN


   PRN Reason: Constipation


Sodium Chloride (Flush - Normal Saline)  10 ml IVF PRN PRN


   PRN Reason: Saline Flush


Zolpidem Tartrate (Ambien)  5 mg PO HSPRN PRN


   PRN Reason: Insomnia

## 2018-04-16 NOTE — PDOC.PN
- Subjective


Encounter Start Date: 04/16/18


Encounter Start Time: 14:54





Pt seen for followup re: rhabdomyolysis.  Denies any chest pain or shortness of 

breath at this time, but reportedly had chest discomfort earlier per nursing 

staff.  Otherwise, pt is mumbling, unable to complete ROS.





- Objective


Resuscitation Status: 


 











Resuscitation Status           FULL:Full Resuscitation














MAR Reviewed: Yes


Vital Signs & Weight: 


 Vital Signs (12 hours)











  Temp Pulse Resp BP Pulse Ox


 


 04/16/18 11:20  98.5 F  94  18  146/89 H  94 L


 


 04/16/18 08:40  98.5 F  75  18  146/97 H  94 L


 


 04/16/18 03:51  98.4 F  79  16  162/110 H  93 L








 Weight











Admit Weight                   199 lb 3.2 oz


 


Weight                         199 lb 3.2 oz














I&O: 


 











 04/15/18 04/16/18 04/17/18





 06:59 06:59 06:59


 


Intake Total 375 3520 


 


Output Total 300 2300 


 


Balance 75 1220 











Result Diagrams: 


 04/16/18 05:01





 04/16/18 05:01


Additional Labs: 


 Accuchecks











  04/16/18 04/16/18 04/16/18





  12:53 11:45 05:43


 


POC Glucose  107  57 L*  98














  04/15/18 04/15/18





  20:13 16:43


 


POC Glucose  125 H  85











EKG Reviewed by me: Yes (Tele:  a. fib; 12 lead EKG:  a. fib, no ST elevations)





Phys Exam





- Physical Examination


Obese


HEENT: PERRLA, moist MMs, sclera anicteric, oral pharynx no lesions


Neck: no nodes, no JVD, supple, full ROM


Respiratory: no wheezing, no rales, no rhonchi, clear to auscultation bilateral


Cardiovascular: no rub, irregular


Gastrointestinal: soft, non-tender, no distention, positive bowel sounds


Musculoskeletal: pulses present


Neurological: moves all 4 limbs


Lymphatic: no nodes


Psychiatric: normal affect


Skin: no rash





Dx/Plan


(1) Rhabdomyolysis


Code(s): M62.82 - RHABDOMYOLYSIS   Status: Acute   Comment: Continue IV fluids, 

CK improving.   





(2) Chest pain


Code(s): R07.9 - CHEST PAIN, UNSPECIFIED   Status: Acute   Comment: Pt denies 

chest pain at this time.  Trend troponins to r/o ACS.   





(3) Diabetes type 2, controlled


Code(s): E11.9 - TYPE 2 DIABETES MELLITUS WITHOUT COMPLICATIONS   Status: 

Chronic   Comment: continue accuchecks, insulin sliding scale   





(4) Hypertension


Code(s): I10 - ESSENTIAL (PRIMARY) HYPERTENSION   Status: Chronic   Comment: 

titrate antihypertensives as needed.   





(5) Obesity (BMI 30-39.9)


Code(s): E66.9 - OBESITY, UNSPECIFIED   Status: Chronic   Comment: stable   





(6) Paroxysmal atrial fibrillation


Code(s): I48.0 - PAROXYSMAL ATRIAL FIBRILLATION   Status: Chronic   Comment: 

Rate-controlled.   





(7) Bipolar disorder


Code(s): F31.9 - BIPOLAR DISORDER, UNSPECIFIED   Status: Chronic   Comment: 

Resume home medications   





- Plan





* .








Review of Systems





- Medications/Allergies


Allergies/Adverse Reactions: 


 Allergies











Allergy/AdvReac Type Severity Reaction Status Date / Time


 


No Known Drug Allergies Allergy   Verified 08/25/15 00:36











Medications: 


 Current Medications





Acetaminophen (Tylenol)  650 mg PO Q4H PRN


   PRN Reason: Headache/Fever or Pain


   Last Admin: 04/15/18 09:21 Dose:  650 mg


Hydrocodone Bitart/Acetaminophen (Norco 5/325)  1 tab PO Q4H PRN


   PRN Reason: Moderate Pain (4-6)


Al Hydroxide/Mg Hydroxide (Maalox)  30 ml PO Q6H PRN


   PRN Reason: Heartburn  or Indigestion


Al Hydroxide/Mg Hydroxide (Maalox Plus)  30 ml PO Q4HR PRN


   PRN Reason: GI Upset


Amlodipine Besylate (Norvasc)  10 mg PO DAILY [facility]


   Last Admin: 04/16/18 08:53 Dose:  10 mg


Clonidine (Catapres)  0.1 mg PO TID PRN


   PRN Reason: Hypertension


Clopidogrel Bisulfate (Plavix)  75 mg PO DAILY [facility]


   Last Admin: 04/16/18 08:53 Dose:  75 mg


Dextrose/Water (Dextrose 50%)  25 gm SLOW IVP PRN PRN


   PRN Reason: Hypoglycemia


Divalproex Sodium (Depakote Er)  500 mg PO HS [facility]


   Last Admin: 04/15/18 21:51 Dose:  500 mg


Divalproex Sodium (Depakote Er)  250 mg PO DAILY [facility]


   Last Admin: 04/16/18 08:53 Dose:  250 mg


Docusate Sodium (Colace)  100 mg PO DAILY PRN


   PRN Reason: Constipation


   Last Admin: 04/15/18 21:52 Dose:  100 mg


Enoxaparin Sodium (Lovenox)  40 mg SC 0900 [facility]


   Last Admin: 04/16/18 11:13 Dose:  40 mg


Famotidine (Pepcid)  20 mg PO BID [facility]


   Last Admin: 04/16/18 08:54 Dose:  20 mg


Fluoxetine HCl (Prozac)  10 mg PO DAILY [facility]


   Last Admin: 04/16/18 08:54 Dose:  10 mg


Glipizide (Glucotrol)  5 mg PO DAILY [facility]


   Last Admin: 04/16/18 08:54 Dose:  5 mg


Glucagon (Glucagon)  1 mg IM PRN PRN


   PRN Reason: Hypoglycemia


Hydroxyzine Pamoate (Vistaril)  50 mg PO HS PRN


   PRN Reason: Insomnia


Dextrose/Water (D5w)  1,000 mls @ 0 mls/hr IV .Q0M PRN; As Directed


   PRN Reason: Hypoglycemia


Sodium Chloride (Normal Saline 0.9%)  1,000 mls @ 75 mls/hr IV .[id number] [facility]


   Last Admin: 04/16/18 04:45 Dose:  1,000 mls


Insulin Human Lispro (Humalog)  0 units SC .MODERATE SLIDING SC PRN


   PRN Reason: Moderate Correctional Scale


Insulin Human Lispro (Humalog)  0 units SC .BEDTIME SLIDING SC PRN


   PRN Reason: Bedtime Correctional Scale


Lisinopril (Zestril)  30 mg PO DAILY [facility]


   Last Admin: 04/16/18 08:54 Dose:  30 mg


Loperamide HCl (Imodium)  2 mg PO PRN PRN


   PRN Reason: Diarrhea/Loose Stools


Loratadine (Claritin)  10 mg PO DAILY [facility]


   Last Admin: 04/16/18 08:53 Dose:  10 mg


Magnesium Hydroxide (Milk Of Magnesium)  30 ml PO Q4H PRN


   PRN Reason: Constipation


Metformin HCl (Glucophage)  1,000 mg PO BID [facility]


   Last Admin: 04/16/18 08:53 Dose:  1,000 mg


Mirtazapine (Remeron)  15 mg PO HS [facility]


   Last Admin: 04/15/18 21:52 Dose:  15 mg


Ondansetron HCl (Zofran Odt)  4 mg PO Q6H PRN


   PRN Reason: Nausea/Vomiting


Ondansetron HCl (Zofran)  4 mg IVP Q6H PRN


   PRN Reason: Nausea/Vomiting


Perphenazine (Trilafon)  2 mg PO BID [facility]


   Last Admin: 04/16/18 11:13 Dose:  2 mg


Risperidone (Risperidone)  0.5 mg PO DAILY [facility]


   Last Admin: 04/16/18 08:52 Dose:  0.5 mg


Risperidone (Risperidone)  1 mg PO HS [facility]


   Last Admin: 04/15/18 21:52 Dose:  1 mg


Senna (Senokot)  2 tab PO HSPRN PRN


   PRN Reason: Constipation


Sodium Chloride (Flush - Normal Saline)  10 ml IVF PRN PRN


   PRN Reason: Saline Flush


Zolpidem Tartrate (Ambien)  5 mg PO HSPRN PRN


   PRN Reason: Insomnia


   Last Admin: 04/15/18 21:52 Dose:  5 mg

## 2018-04-17 NOTE — PDOC.PN
- Subjective


Encounter Start Date: 04/17/18


Encounter Start Time: 07:40





Pt seen for followup re: rhabdomyolysis.  Mumbling, unable to complete ROS.  He 

does deny having any chest pain.





- Objective


Resuscitation Status: 


 











Resuscitation Status           FULL:Full Resuscitation














MAR Reviewed: Yes


Vital Signs & Weight: 


 Vital Signs (12 hours)











  Temp Pulse Resp BP BP Pulse Ox


 


 04/17/18 12:03  98.4 F  75  18   138/82  95


 


 04/17/18 08:55  99.0 F  84  16   137/77  94 L


 


 04/17/18 08:10  99.0 F  84  16   


 


 04/17/18 04:30  97.6 F  74  18  141/88 H   93 L








 Weight











Admit Weight                   199 lb 3.2 oz


 


Weight                         205 lb 5 oz














I&O: 


 











 04/16/18 04/17/18 04/18/18





 06:59 06:59 06:59


 


Intake Total 3520 2460 472


 


Output Total 2300  850


 


Balance 1220 2460 -378











Result Diagrams: 


 04/17/18 04:42





 04/17/18 04:42


Additional Labs: 


 Accuchecks











  04/17/18 04/17/18 04/16/18





  10:16 05:55 20:40


 


POC Glucose  135 H  112 H  114 H














  04/16/18





  16:50


 


POC Glucose  90











EKG Reviewed by me: Yes (Tele:  demetris frank)





Phys Exam





- Physical Examination


Constitutional: NAD


HEENT: PERRLA, moist MMs, sclera anicteric, oral pharynx no lesions


Neck: no nodes, no JVD, supple, full ROM


Respiratory: no wheezing, no rales, no rhonchi, clear to auscultation bilateral


Cardiovascular: no rub, irregular


Gastrointestinal: soft, non-tender, no distention, positive bowel sounds


Neurological: moves all 4 limbs


Psychiatric: normal affect





Dx/Plan


(1) Rhabdomyolysis


Code(s): M62.82 - RHABDOMYOLYSIS   Status: Acute   Comment: CK improving.  

Continue IV fluids for now until discharge.   





(2) Diabetes type 2, controlled


Code(s): E11.9 - TYPE 2 DIABETES MELLITUS WITHOUT COMPLICATIONS   Status: 

Chronic   Comment: continue accuchecks, insulin sliding scale   





(3) Hypertension


Code(s): I10 - ESSENTIAL (PRIMARY) HYPERTENSION   Status: Chronic   Comment: 

Monitor vital signs and titrate antihypertensives as needed.  


Controlled at this time.   





(4) Obesity (BMI 30-39.9)


Code(s): E66.9 - OBESITY, UNSPECIFIED   Status: Chronic   Comment: stable   





(5) Paroxysmal atrial fibrillation


Code(s): I48.0 - PAROXYSMAL ATRIAL FIBRILLATION   Status: Chronic   Comment: 

Rate-controlled.   





(6) Bipolar disorder


Code(s): F31.9 - BIPOLAR DISORDER, UNSPECIFIED   Status: Chronic   Comment: 

home medications resumed   





(7) Chest pain


Code(s): R07.9 - CHEST PAIN, UNSPECIFIED   Status: Resolved   Comment: Pt 

denies chest pain.   





- Plan





* .


Pt medically cleared for discharge. Await [facility] input.





Review of Systems





- Medications/Allergies


Allergies/Adverse Reactions: 


 Allergies











Allergy/AdvReac Type Severity Reaction Status Date / Time


 


No Known Drug Allergies Allergy   Verified 08/25/15 00:36











Medications: 


 Current Medications





Acetaminophen (Tylenol)  650 mg PO Q4H PRN


   PRN Reason: Headache/Fever or Pain


   Last Admin: 04/16/18 21:08 Dose:  650 mg


Hydrocodone Bitart/Acetaminophen (Norco 5/325)  1 tab PO Q4H PRN


   PRN Reason: Moderate Pain (4-6)


Al Hydroxide/Mg Hydroxide (Maalox)  30 ml PO Q6H PRN


   PRN Reason: Heartburn  or Indigestion


Al Hydroxide/Mg Hydroxide (Maalox Plus)  30 ml PO Q4HR PRN


   PRN Reason: GI Upset


Amlodipine Besylate (Norvasc)  10 mg PO DAILY [facility]


   Last Admin: 04/17/18 09:23 Dose:  10 mg


Clonidine (Catapres)  0.1 mg PO TID PRN


   PRN Reason: Hypertension


Clopidogrel Bisulfate (Plavix)  75 mg PO DAILY [facility]


   Last Admin: 04/17/18 09:22 Dose:  75 mg


Dextrose/Water (Dextrose 50%)  25 gm SLOW IVP PRN PRN


   PRN Reason: Hypoglycemia


Divalproex Sodium (Depakote Er)  500 mg PO HS [facility]


   Last Admin: 04/16/18 21:06 Dose:  500 mg


Divalproex Sodium (Depakote Er)  250 mg PO DAILY [facility]


   Last Admin: 04/17/18 09:24 Dose:  250 mg


Docusate Sodium (Colace)  100 mg PO DAILY PRN


   PRN Reason: Constipation


   Last Admin: 04/15/18 21:52 Dose:  100 mg


Enoxaparin Sodium (Lovenox)  40 mg SC 0900 [facility]


   Last Admin: 04/17/18 09:22 Dose:  40 mg


Famotidine (Pepcid)  20 mg PO BID [facility]


   Last Admin: 04/17/18 09:24 Dose:  20 mg


Fluoxetine HCl (Prozac)  10 mg PO DAILY [facility]


   Last Admin: 04/17/18 09:23 Dose:  10 mg


Glipizide (Glucotrol)  5 mg PO DAILY [facility]


   Last Admin: 04/17/18 09:23 Dose:  5 mg


Glucagon (Glucagon)  1 mg IM PRN PRN


   PRN Reason: Hypoglycemia


Hydroxyzine Pamoate (Vistaril)  50 mg PO HS PRN


   PRN Reason: Insomnia


Dextrose/Water (D5w)  1,000 mls @ 0 mls/hr IV .Q0M PRN; As Directed


   PRN Reason: Hypoglycemia


Sodium Chloride (Normal Saline 0.9%)  1,000 mls @ 75 mls/hr IV .[id number] [facility]


   Last Admin: 04/17/18 08:25 Dose:  Not Given


Insulin Human Lispro (Humalog)  0 units SC .MODERATE SLIDING SC PRN


   PRN Reason: Moderate Correctional Scale


Insulin Human Lispro (Humalog)  0 units SC .BEDTIME SLIDING SC PRN


   PRN Reason: Bedtime Correctional Scale


Lisinopril (Zestril)  30 mg PO DAILY [facility]


   Last Admin: 04/17/18 09:22 Dose:  30 mg


Loperamide HCl (Imodium)  2 mg PO PRN PRN


   PRN Reason: Diarrhea/Loose Stools


Loratadine (Claritin)  10 mg PO DAILY [facility]


   Last Admin: 04/17/18 09:23 Dose:  10 mg


Magnesium Hydroxide (Milk Of Magnesium)  30 ml PO Q4H PRN


   PRN Reason: Constipation


Metformin HCl (Glucophage)  1,000 mg PO BID [facility]


   Last Admin: 04/17/18 09:22 Dose:  1,000 mg


Mirtazapine (Remeron)  15 mg PO HS [facility]


   Last Admin: 04/16/18 21:07 Dose:  15 mg


Ondansetron HCl (Zofran Odt)  4 mg PO Q6H PRN


   PRN Reason: Nausea/Vomiting


Ondansetron HCl (Zofran)  4 mg IVP Q6H PRN


   PRN Reason: Nausea/Vomiting


Perphenazine (Trilafon)  2 mg PO BID [facility]


   Last Admin: 04/17/18 09:24 Dose:  2 mg


Risperidone (Risperidone)  0.5 mg PO DAILY [facility]


   Last Admin: 04/17/18 09:23 Dose:  0.5 mg


Risperidone (Risperidone)  1 mg PO HS [facility]


   Last Admin: 04/16/18 21:07 Dose:  1 mg


Senna (Senokot)  2 tab PO HSPRN PRN


   PRN Reason: Constipation


Sodium Chloride (Flush - Normal Saline)  10 ml IVF PRN PRN


   PRN Reason: Saline Flush


Zolpidem Tartrate (Ambien)  5 mg PO HSPRN PRN


   PRN Reason: Insomnia


   Last Admin: 04/15/18 21:52 Dose:  5 mg

## 2018-04-18 NOTE — PDOC.PN
- Subjective


Encounter Start Date: 04/18/18


Encounter Start Time: 08:20





Pt seen for followup re: rhabdomyolysis.  Unable to complete ROS because pt was 

mumbling.





- Objective


Resuscitation Status: 


 











Resuscitation Status           FULL:Full Resuscitation














MAR Reviewed: Yes


Vital Signs & Weight: 


 Vital Signs (12 hours)











  Temp Pulse Resp BP BP BP Pulse Ox


 


 04/18/18 11:57  97.3 F L  91  18    118/81  91 L


 


 04/18/18 09:37   80     


 


 04/18/18 09:36     141/88 H   


 


 04/18/18 08:00  98.2 F  80  20    


 


 04/18/18 07:58  98.2 F  80  20    133/92 H  94 L


 


 04/18/18 04:00  98.6 F  75  20   148/95 H   93 L








 Weight











Admit Weight                   199 lb 3.2 oz


 


Weight                         205 lb 5 oz














I&O: 


 











 04/17/18 04/18/18 04/19/18





 06:59 06:59 06:59


 


Intake Total 2460 2200 


 


Output Total  1950 


 


Balance 2460 250 











Result Diagrams: 


 04/18/18 04:35





 04/18/18 04:35


Additional Labs: 


 Accuchecks











  04/18/18 04/18/18 04/17/18





  11:32 04:34 19:48


 


POC Glucose  131 H  89  144 H














  04/17/18





  16:34


 


POC Glucose  72














Phys Exam





- Physical Examination


Constitutional: NAD


HEENT: moist MMs


Neck: supple


Respiratory: clear to auscultation bilateral


Cardiovascular: RRR


Gastrointestinal: soft, non-tender


Neurological: moves all 4 limbs


Psychiatric: normal affect





Dx/Plan


(1) Rhabdomyolysis


Code(s): M62.82 - RHABDOMYOLYSIS   Status: Acute   Comment: CK improved, pt if 

off of IV fluids.  maintaining good oral intake.   





(2) Diabetes type 2, controlled


Code(s): E11.9 - TYPE 2 DIABETES MELLITUS WITHOUT COMPLICATIONS   Status: 

Chronic   Comment: accuchecks, insulin sliding scale   





(3) Hypertension


Code(s): I10 - ESSENTIAL (PRIMARY) HYPERTENSION   Status: Chronic   Comment: 

Stable


   





(4) Obesity (BMI 30-39.9)


Code(s): E66.9 - OBESITY, UNSPECIFIED   Status: Chronic   Comment: stable   





(5) Paroxysmal atrial fibrillation


Code(s): I48.0 - PAROXYSMAL ATRIAL FIBRILLATION   Status: Chronic   Comment: 

Rate-controlled.   





(6) Bipolar disorder


Code(s): F31.9 - BIPOLAR DISORDER, UNSPECIFIED   Status: Chronic   Comment: 

home medications resumed   





(7) Chest pain


Code(s): R07.9 - CHEST PAIN, UNSPECIFIED   Status: Resolved   





- Plan





* .








Review of Systems





- Medications/Allergies


Allergies/Adverse Reactions: 


 Allergies











Allergy/AdvReac Type Severity Reaction Status Date / Time


 


No Known Drug Allergies Allergy   Verified 08/25/15 00:36











Medications: 


 Current Medications





Acetaminophen (Tylenol)  650 mg PO Q4H PRN


   PRN Reason: Headache/Fever or Pain


   Last Admin: 04/16/18 21:08 Dose:  650 mg


Hydrocodone Bitart/Acetaminophen (Norco 5/325)  1 tab PO Q4H PRN


   PRN Reason: Moderate Pain (4-6)


Al Hydroxide/Mg Hydroxide (Maalox)  30 ml PO Q6H PRN


   PRN Reason: Heartburn  or Indigestion


Al Hydroxide/Mg Hydroxide (Maalox Plus)  30 ml PO Q4HR PRN


   PRN Reason: GI Upset


Amlodipine Besylate (Norvasc)  10 mg PO DAILY [facility]


   Last Admin: 04/18/18 09:37 Dose:  10 mg


Clonidine (Catapres)  0.1 mg PO TID PRN


   PRN Reason: Hypertension


Clopidogrel Bisulfate (Plavix)  75 mg PO DAILY [facility]


   Last Admin: 04/18/18 09:37 Dose:  75 mg


Dextrose/Water (Dextrose 50%)  25 gm SLOW IVP PRN PRN


   PRN Reason: Hypoglycemia


Divalproex Sodium (Depakote Er)  500 mg PO HS [facility]


   Last Admin: 04/17/18 22:15 Dose:  500 mg


Divalproex Sodium (Depakote Er)  250 mg PO DAILY [facility]


   Last Admin: 04/18/18 09:37 Dose:  250 mg


Docusate Sodium (Colace)  100 mg PO DAILY PRN


   PRN Reason: Constipation


   Last Admin: 04/15/18 21:52 Dose:  100 mg


Enoxaparin Sodium (Lovenox)  40 mg SC 0900 [facility]


   Last Admin: 04/18/18 09:36 Dose:  40 mg


Famotidine (Pepcid)  20 mg PO BID [facility]


   Last Admin: 04/18/18 09:36 Dose:  20 mg


Fluoxetine HCl (Prozac)  10 mg PO DAILY [facility]


   Last Admin: 04/18/18 09:37 Dose:  10 mg


Glipizide (Glucotrol)  5 mg PO DAILY [facility]


   Last Admin: 04/18/18 09:37 Dose:  5 mg


Glucagon (Glucagon)  1 mg IM PRN PRN


   PRN Reason: Hypoglycemia


Hydroxyzine Pamoate (Vistaril)  50 mg PO HS PRN


   PRN Reason: Insomnia


Dextrose/Water (D5w)  1,000 mls @ 0 mls/hr IV .Q0M PRN; As Directed


   PRN Reason: Hypoglycemia


Insulin Human Lispro (Humalog)  0 units SC .MODERATE SLIDING SC PRN


   PRN Reason: Moderate Correctional Scale


Insulin Human Lispro (Humalog)  0 units SC .BEDTIME SLIDING SC PRN


   PRN Reason: Bedtime Correctional Scale


Lisinopril (Zestril)  30 mg PO DAILY [facility]


   Last Admin: 04/18/18 09:36 Dose:  30 mg


Loperamide HCl (Imodium)  2 mg PO PRN PRN


   PRN Reason: Diarrhea/Loose Stools


Loratadine (Claritin)  10 mg PO DAILY [facility]


   Last Admin: 04/18/18 09:38 Dose:  10 mg


Magnesium Hydroxide (Milk Of Magnesium)  30 ml PO Q4H PRN


   PRN Reason: Constipation


Metformin HCl (Glucophage)  1,000 mg PO BID [facility]


   Last Admin: 04/18/18 09:37 Dose:  1,000 mg


Mirtazapine (Remeron)  15 mg PO HS [facility]


   Last Admin: 04/17/18 22:15 Dose:  15 mg


Ondansetron HCl (Zofran Odt)  4 mg PO Q6H PRN


   PRN Reason: Nausea/Vomiting


Ondansetron HCl (Zofran)  4 mg IVP Q6H PRN


   PRN Reason: Nausea/Vomiting


Perphenazine (Trilafon)  2 mg PO BID [facility]


   Last Admin: 04/18/18 09:37 Dose:  2 mg


Risperidone (Risperidone)  0.5 mg PO DAILY [facility]


   Last Admin: 04/18/18 09:38 Dose:  0.5 mg


Risperidone (Risperidone)  1 mg PO HS [facility]


   Last Admin: 04/17/18 22:16 Dose:  1 mg


Senna (Senokot)  2 tab PO HSPRN PRN


   PRN Reason: Constipation


Sodium Chloride (Flush - Normal Saline)  10 ml IVF PRN PRN


   PRN Reason: Saline Flush


Zolpidem Tartrate (Ambien)  5 mg PO HSPRN PRN


   PRN Reason: Insomnia


   Last Admin: 04/15/18 21:52 Dose:  5 mg

## 2018-04-20 NOTE — DIS
DATE OF ADMISSION:  04/14/2018

 

DATE OF DISCHARGE:  04/19/2018

 

PRIMARY CARE PROVIDER:  None.

 

DISCHARGE DIAGNOSIS:  Rhabdomyolysis.

 

CONDITION OF PATIENT ON THE DAY OF DISCHARGE:  Stable.  I assessed Mr. Ojeda on the day of discha
rge.  He was mumbling, unable to provide any history.  Vital signs are stable.  S1 and S2 are heard, 
irregular.  Lungs are clear to auscultation bilaterally.

 

HOSPITAL COURSE:  Mr. Ojeda is a pleasant 57-year-old gentleman who was admitted to [facility] on 04/14/2018 for rhabdomyolysis.  He was treated with intravenous fluids.  His 
creatinine was normal.  On 04/18/2018, his CK level had improved to 355, down from 1814 on 04/14/2018
.  [facility] was consulted.  He is being discharged to [facility], from where he 
was admitted to the hospital.

 

DISCHARGE DESTINATION:  [facility].

 

DISCHARGE MEDICATIONS:  No change was made to his preadmission home medications as dictated on histor
y and physical note from 04/14/2018.

## 2018-04-22 NOTE — EKG
Test Reason : C/O CHEST PAIN

Blood Pressure : ***/*** mmHG

Vent. Rate : 098 BPM     Atrial Rate : 078 BPM

   P-R Int : 000 ms          QRS Dur : 092 ms

    QT Int : 344 ms       P-R-T Axes : 000 028 -07 degrees

   QTc Int : 439 ms

 

Atrial fibrillation

Nonspecific T wave abnormality , probably digitalis effect

Abnormal ECG

Confirmed by OMAIRA CARRILLO MD ([id number]) on 4/22/2018 8:39:14 AM

 

Referred By:  MEDARDO           Confirmed By:OMAIRA CARRILLO MD

## 2018-04-26 NOTE — RAD
PORTABLE CHEST:

 

Date:  04/26/18 

 

COMPARISON:  

04/04/18 study. 

 

HISTORY:  

Chest pain. 

 

FINDINGS:

Heart size is upper limits of normal. Mediastinal structures appear unremarkable. Lungs are clear of 
any infiltrative process. 

 

IMPRESSION: 

No active intrathoracic disease. 

 

 

POS: [facility]

## 2018-07-07 NOTE — RAD
SINGLE VIEW OF THE CHEST:

7/7/18

 

COMPARISON:  

None.

 

HISTORY: 

Altered mental status. Chest pain. 

 

FINDINGS:

Single view of the chest shows an enlarged cardiomediastinal silhouette. There is stable elevation of
 the right hemidiaphragm. There is no evidence of consolidation, mass, or pleural effusion. 

 

IMPRESSION:

Cardiomegaly.

 

POS: [facility]

## 2018-07-07 NOTE — CT
CT OF THE BRAIN WITHOUT CONTRAST:

7/7/18

 

COMPARISON:  

4/14/18.

 

HISTORY: 

Altered mental status.

 

TECHNIQUE:  

Multiple contiguous axial images were obtained in a CT of the brain without contrast. 

 

FINDINGS:  

The brain is normal in morphology and attenuation without focal lesions or confluent areas of infarct
ion. There is no evidence of hydrocephalus, intracranial hemorrhage, or extra-axial fluid collection.
 

 

The calvarium and overlying soft tissues are unremarkable. There is a mucous retention cyst in the ri
ght maxillary sinus. The mastoid air cells are well aerated. 

 

IMPRESSION:  

No evidence of acute intracranial abnormality.  

 

POS: [facility]

## 2018-07-08 NOTE — HP
CODE STATUS:  FULL CODE.

 

HISTORY OF PRESENT ILLNESS:  This is a 57-year-old male patient with past 
medical history of schizophrenia, history is obtained from records and ER staff 
since patient does not communicate properly, although he is alert.  The patient 
presented with chief complaint of change in mental status.

 

The patient had been confused, this is a recurrent admission for the same 
problem, he had been here in the hospital last time in records was in 05/19/
2018 for the same symptoms.  The patient seems to be confused, and 
noncooperative during the interview, only answer simple questions, the patient 
seems to be rigid, some hyperthermia with 100.9.  Symptoms are moderate to 
severe, he seems calm, no clear triggers, no alleviating factors.  We are 
considering diagnosis of infection, tap has been done.  Also another diagnosis 
we were considering is neuroleptic malignant syndrome; however, it is not clear 
what medication he was taking at home, there is no hemodynamic or autonomic 
instability at this point.  We will monitor on tele.  We will hold psych 
medications.  We will need Neurology assisted in the morning for further 
management, if patient deteriorates, we will place him in ICU.  Respiratory 
status is stable.

 

REVIEW OF SYSTEMS:  Unable to obtain since patient is not able to give a clear 
history.

 

PAST MEDICAL HISTORY:  History of diabetes, type 2; hypertension; atrial 
fibrillation.

 

PAST SURGICAL HISTORY:  No surgical history found in records.

 

PSYCHIATRIC HISTORY:  Schizophrenia, anxiety, bipolar disorder, depression.

 

SOCIAL HISTORY:  No alcohol, no drugs.  No smoking history.

 

ALLERGIES:  No reported drug allergies.

 

MEDICATIONS:  On record, metformin, Pepcid, Remeron, Norvasc, Glucotrol, 
lisinopril, aspirin, clonazepam, Depakote.  This list was as per last admission
, unable to know if this is completely accurate.

 

PHYSICAL EXAMINATION:

VITAL SIGNS:  On presentation, blood pressure 108/79 with heart rate 99, 
respiratory rate 15, temperature 100.6, saturation 98 on room air.

GENERAL APPEARANCE:  The patient is alert, disoriented, unable to answer simple 
questions.

HEENT:  Eyes:  Normal conjunctivae.  Moist oral mucosa.  Anicteric.

NECK:  No JVD.

RESPIRATORY:  Bilateral air entry.  No rales, no wheezing.  Symmetric expansion.

CARDIOVASCULAR:  Normal rate, regular rhythm, no murmurs, no gallops, no edema.

ABDOMEN:  Soft, normal bowel sounds.

MUSCULOSKELETAL:  Baseline range of motion and strength.  No tenderness.  The 
patient is rigid bilaterally with increased tone.

SKIN:  Warm and intact.  No pallor, no rash, no redness.

NEUROLOGIC:  The patient has increased muscle tone in all 4 extremities.  Able 
to bend his knees and also right upper extremities; however, there is some 
resistance with.  Cranial nerves seem to be intact.

PSYCHIATRIC:  The patient is with a flat affect, no anxious, suboptimal 
judgment.

 

LABORATORY DATA AND IMAGING:  Labs are reviewed.  White count 13.1, hemoglobin 
16, MCV 91, platelet count 305.  Sodium 140, potassium 4.2, chloride 105, 
carbon dioxide 20, anion gap 19, BUN 45, creatinine 1.6.  In previous admissions
, as per old records the creatinine was 1.0.  Glucose 170.  Lactic acid 2.3, 
calcium 9.9, total bilirubin 1.3, AST 49.  CK 1284.  Serum total protein 8.5, 
globulin 4.0, lipase 27.  TSH 2.8.  A urine was done.  The patient has no red 
blood cells, no white blood cells, negative for blood.  Urine tox was negative.
  Brain CT was done.  The patient has no evidence of any acute intracranial 
abnormality.  Chest x-ray was reviewed.  The patient has cardiomegaly.

 

EKG was discussed with the performing physician from ER, no acute abnormalities 
were seen.

 

ASSESSMENT AND PLAN:  The patient will be placed in the hospital for the 
following medical problems.

1.  Fever of 100.6, unclear etiology.  The patient also has increased rigidity, 
increased CK, elevated BUN and creatinine, there is always concern for 
neuroleptic malignant syndrome; however, it is not clear what medication the 
patient was taken at home.  In one of the list, there was listed risperidone; 
however, in the other list is not showing that the patient was taken it. We 
will continue to monitor him.  The patient has altered mental status, 
hyperthermia, and increased muscle tones with increased CK, however, but there 
is no hemodynamic or autonomic instability.  We will monitor the patient on 
telemetry, we will rule out infection, spinal tap has been done, not resulted 
yet.  For now, we will cover with broad spectrum antibiotics, follow cultures.  
We will hold any antipsychotic medications.

2.  Uncontrolled diabetes, hyperglycemia, reconcile home medications.  Sliding 
scale for optimal control.

3.  Lactic acidosis.  The patient has a lactic acid of 2.3.  We will treat 
underlying condition.  Continue hydration.  Could be secondary to underlying 
sepsis.

4.  Possible sepsis.  The patient has elevated white count, fever, lactic 
acidosis, started on broad spectrum antibiotics, source not clear.  If 
infection present, most likely source could be culture and sensitivity.  Follow 
up culture, continue antibiotics, IV fluids.

5.  Increase CK, small increase in CK of 1284, we will monitor CK.  We will 
increase hydration.

6.  Dehydration.  The patient has high BUN and creatinine, we will continue 
hydration.  Monitor kidney function.

7.  Acute kidney injury with elevation of creatinine to 1.6.  In previous 
admissions as per old records is1.0.  Continue hydration, monitor kidney 
function.  If not improving, may need Nephrology evaluation for assistance.

8.  Deep venous thrombosis prophylaxis.

9.  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 
change in mental status, increased muscle tone.  Unclear, he had been taking 
medications at home.  We will monitor, if any NMS is  ruled out, need to 
restart the patient on antipsychotic medications.  We will need neuro see him 
in the morning and psych evaluation.

 

RANDAD

## 2018-07-08 NOTE — PDOC.PN
- Subjective


Encounter Start Date: 07/08/18


Encounter Start Time: 08:45


Subjective: awake, responds to 1 or 2 questions well but moslty tangential 

responses or


-: he remains mute. Not in distress


-: says he is hungry





- Objective


MAR Reviewed: Yes


Vital Signs & Weight: 


 Vital Signs (12 hours)











  Temp Pulse Resp BP Pulse Ox


 


 07/08/18 09:04   94   


 


 07/08/18 08:52  100.5 F H  94  28 H  178/95 H  98


 


 07/08/18 04:00  99.1 F  97  16  149/92 H  99








 Weight











Weight                         199 lb 1.6 oz














Result Diagrams: 


 07/07/18 18:29





 07/07/18 18:29


Additional Labs: 


 Accuchecks











  07/08/18 07/08/18





  11:08 06:01


 


POC Glucose  214 H  130 H














Phys Exam





- Physical Examination


HEENT: PERRLA, sclera anicteric


Neck: no JVD, supple


Respiratory: no wheezing, no rales


Cardiovascular: RRR, no significant murmur


Gastrointestinal: soft, non-tender, positive bowel sounds


Musculoskeletal: no edema, pulses present


Neurological: non-focal, moves all 4 limbs


not fully oriented, flat affect, has been hallucinating per staff





Dx/Plan


(1) Acute encephalopathy


Code(s): G93.40 - ENCEPHALOPATHY, UNSPECIFIED   Status: Acute   





(2) Rhabdomyolysis


Code(s): M62.82 - RHABDOMYOLYSIS   Status: Acute   





(3) Sepsis


Code(s): A41.9 - SEPSIS, UNSPECIFIED ORGANISM   Status: Suspected   


Qualifiers: 


   Sepsis type: sepsis due to unspecified organism   Qualified Code(s): A41.9 - 

Sepsis, unspecified organism   





(4) Schizoaffective disorder


Code(s): F25.9 - SCHIZOAFFECTIVE DISORDER, UNSPECIFIED   Status: Chronic   


Qualifiers: 


   Schizoaffective disorder type: unspecified   Qualified Code(s): F25.9 - 

Schizoaffective disorder, unspecified   





(5) Diabetes type 2, controlled


Code(s): E11.9 - TYPE 2 DIABETES MELLITUS WITHOUT COMPLICATIONS   Status: 

Chronic   


Qualifiers: 


   Diabetes mellitus long term insulin use: without long term use   Diabetes 

mellitus complication status: with unspecified complications   Qualified Code(s)

: E11.8 - Type 2 diabetes mellitus with unspecified complications   


Comment: accuchecks, insulin sliding scale   





(6) Hypertension


Code(s): I10 - ESSENTIAL (PRIMARY) HYPERTENSION   Status: Chronic   


Qualifiers: 


   Hypertension type: essential hypertension   Qualified Code(s): I10 - 

Essential (primary) hypertension   


Comment: Stable


   





(7) Obesity (BMI 30-39.9)


Code(s): E66.9 - OBESITY, UNSPECIFIED   Status: Chronic   





- Plan


continue iv hydration and antibiotics for 24hrs


-: prelim blood csx2 and csf gram stain are -ve, UA is -ve for inf


-: cxr shows no infiltrate but has right hemidiaphragm elevated ?chronic


-: will dc antibiotics in am if he remains afebrile


-: [facility] help to obtain home med list, unclear baseline cognition





* .


ck, cmp and cbc levels today and in am


Will likely need inpt psych hospitalization


May have 1:1 sitter (was suicidal and homicidal prior to arrival?)


Hopefully by am he will be able to respond better with hydration.


Will talk to son later today and get more info





Review of Systems





- Medications/Allergies


Allergies/Adverse Reactions: 


 Allergies











Allergy/AdvReac Type Severity Reaction Status Date / Time


 


No Known Drug Allergies Allergy   Verified 07/07/18 23:28











Medications: 


 Current Medications





Acetaminophen (Tylenol Er (8hr Arthritis Pain))  650 mg PO Q4H PRN


   PRN Reason: Pain


Amlodipine Besylate (Norvasc)  10 mg PO DAILY [facility]


   Last Admin: 07/08/18 09:04 Dose:  10 mg


Clonidine (Catapres)  0.1 mg PO TID PRN


   PRN Reason: Hypertension


Clopidogrel Bisulfate (Plavix)  75 mg PO DAILY [facility]


   Last Admin: 07/08/18 09:05 Dose:  75 mg


Dextrose/Water (Dextrose 50%)  25 gm SLOW IVP PRN PRN


   PRN Reason: Hypoglycemia


Divalproex Sodium (Depakote Er)  500 mg PO HS [facility]


Divalproex Sodium (Depakote Er)  250 mg PO DAILY [facility]


   Last Admin: 07/08/18 09:23 Dose:  250 mg


Docusate Sodium (Colace)  100 mg PO DAILY PRN


   PRN Reason: Constipation


Famotidine (Pepcid)  20 mg PO DAILY [facility]


   Last Admin: 07/08/18 09:05 Dose:  20 mg


Glucagon (Glucagon)  1 mg IM PRN PRN


   PRN Reason: Hypoglycemia


Sodium Chloride (Normal Saline 0.9%)  1,000 mls @ 150 mls/hr IV .Q6H40M [facility]


   Last Admin: 07/08/18 09:01 Dose:  1,000 mls


Vancomycin HCl 1 gm/ Device  200 mls @ 200 mls/hr IVPB Q12HR [facility]


   Last Admin: 07/08/18 09:04 Dose:  200 mls


Dextrose/Water (D5w)  1,000 mls @ 0 mls/hr IV .Q0M PRN; As Directed


   PRN Reason: Hypoglycemia


Cefepime HCl 2 gm/ Sodium (Chloride)  100 mls @ 200 mls/hr IVPB 0200,1400 [facility]


   Last Admin: 07/08/18 01:19 Dose:  100 mls


Insulin Human Regular (Humulin R)  0 units SC .MILD SLIDING SCALE PRN


   PRN Reason: Mild Correctional Scale
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
## 2018-07-09 NOTE — PDOC.PN
- Subjective


Encounter Start Date: 07/09/18


Encounter Start Time: 08:30


Subjective: awake, ate his breakfast


-: not communicating much this am


-: walked 80ft with PT yesterday 





- Objective


MAR Reviewed: Yes


Vital Signs & Weight: 


 Vital Signs (12 hours)











  Temp Pulse Resp BP Pulse Ox


 


 07/09/18 08:35  98.9 F  98  20  138/82  92 L


 


 07/09/18 03:45  98.8 F  98  21 H  144/87 H  94 L


 


 07/08/18 23:25  98.4 F  87  20  168/88 H  93 L








 Weight











Weight                         200 lb 11.2 oz














I&O: 


 











 07/08/18 07/09/18 07/10/18





 06:59 06:59 06:59


 


Intake Total  4139 


 


Balance  4139 











Result Diagrams: 


 07/09/18 04:49





 07/09/18 04:49


Additional Labs: 


 Accuchecks











  07/09/18 07/08/18 07/08/18





  06:09 20:57 17:05


 


POC Glucose  127 H  148 H  160 H














  07/08/18





  11:08


 


POC Glucose  214 H














Phys Exam





- Physical Examination


HEENT: PERRLA, moist MMs


Neck: no JVD, supple


Respiratory: no wheezing, no rales


Cardiovascular: no significant murmur, irregular


Gastrointestinal: soft, non-tender, positive bowel sounds


Musculoskeletal: no edema, pulses present


Neurological: non-focal, moves all 4 limbs





Dx/Plan


(1) Acute encephalopathy


Code(s): G93.40 - ENCEPHALOPATHY, UNSPECIFIED   Status: Acute   





(2) Rhabdomyolysis


Code(s): M62.82 - RHABDOMYOLYSIS   Status: Acute   





(3) Sepsis


Code(s): A41.9 - SEPSIS, UNSPECIFIED ORGANISM   Status: Ruled-out   


Qualifiers: 


   Sepsis type: sepsis due to unspecified organism   Qualified Code(s): A41.9 - 

Sepsis, unspecified organism   





(4) Schizoaffective disorder


Code(s): F25.9 - SCHIZOAFFECTIVE DISORDER, UNSPECIFIED   Status: Chronic   


Qualifiers: 


   Schizoaffective disorder type: unspecified   Qualified Code(s): F25.9 - 

Schizoaffective disorder, unspecified   





(5) Diabetes type 2, controlled


Code(s): E11.9 - TYPE 2 DIABETES MELLITUS WITHOUT COMPLICATIONS   Status: 

Chronic   


Qualifiers: 


   Diabetes mellitus long term insulin use: without long term use   Diabetes 

mellitus complication status: with unspecified complications   Qualified Code(s)

: E11.8 - Type 2 diabetes mellitus with unspecified complications   


Comment: accuchecks, insulin sliding scale   





(6) Hypertension


Code(s): I10 - ESSENTIAL (PRIMARY) HYPERTENSION   Status: Chronic   


Qualifiers: 


   Hypertension type: essential hypertension   Qualified Code(s): I10 - 

Essential (primary) hypertension   


Comment: Stable


   





(7) Obesity (BMI 30-39.9)


Code(s): E66.9 - OBESITY, UNSPECIFIED   Status: Chronic   





- Plan


has afib/flutter on telemetry, will add cardizem cd 120mg bid, asp


-: echo, dc norvasc, cefepime and vanc, add omnicef


-: pan cultures are -ve


-: ck levels down to 603, no focal signs on clinical exam


-: add geodon 20mg QHS with cogentin to depakote





* .


Needs in psychiatric facility, [facility] to start working towards the same.


to ambulate as tolerated, has 1:1 sitter due to homicidal/suicidal ideation on 

arrival, none here so far, doesn't talk much to know anything.





Review of Systems





- Medications/Allergies


Allergies/Adverse Reactions: 


 Allergies











Allergy/AdvReac Type Severity Reaction Status Date / Time


 


No Known Drug Allergies Allergy   Verified 07/07/18 23:28











Medications: 


 Current Medications





Acetaminophen (Tylenol Er (8hr Arthritis Pain))  650 mg PO Q4H PRN


   PRN Reason: Pain


Benztropine Mesylate (Cogentin)  1 mg PO DAILY [facility]


   Last Admin: 07/09/18 09:36 Dose:  1 mg


Cefdinir (Omnicef)  300 mg PO BID [facility]


Clonidine (Catapres)  0.1 mg PO TID PRN


   PRN Reason: Hypertension


Clopidogrel Bisulfate (Plavix)  75 mg PO DAILY [facility]


   Last Admin: 07/09/18 09:36 Dose:  75 mg


Dextrose/Water (Dextrose 50%)  25 gm SLOW IVP PRN PRN


   PRN Reason: Hypoglycemia


Diltiazem HCl (Cardizem Cd)  120 mg PO BID [facility]


Divalproex Sodium (Depakote Er)  500 mg PO HS [facility]


   Last Admin: 07/08/18 20:29 Dose:  500 mg


Divalproex Sodium (Depakote Er)  250 mg PO DAILY [facility]


   Last Admin: 07/08/18 09:23 Dose:  250 mg


Docusate Sodium (Colace)  100 mg PO DAILY [facility]


   PRN Reason: Constipation


Famotidine (Pepcid)  20 mg PO BID [facility]


   Last Admin: 07/09/18 09:36 Dose:  20 mg


Glucagon (Glucagon)  1 mg IM PRN PRN


   PRN Reason: Hypoglycemia


Dextrose/Water (D5w)  1,000 mls @ 0 mls/hr IV .Q0M PRN; As Directed


   PRN Reason: Hypoglycemia


Insulin Human Regular (Humulin R)  0 units SC .MILD SLIDING SCALE PRN


   PRN Reason: Mild Correctional Scale


   Last Admin: 07/08/18 13:20 Dose:  3 unit


Sodium Chloride (Flush - Normal Saline)  10 ml IVF Q12HR [facility]


   Last Admin: 07/09/18 09:36 Dose:  10 ml


Sodium Chloride (Flush - Normal Saline)  10 ml IVF PRN PRN


   PRN Reason: Saline Flush


Ziprasidone (Geodon)  20 mg PO HS [facility]

## 2018-07-10 NOTE — PDOC.PN
- Subjective


Encounter Start Date: 07/10/18


Encounter Start Time: 10:30


Subjective: awake, is closing his eyes and blinking rapidly


-: not in distress


-: sitter at bedside





- Objective


MAR Reviewed: Yes


Vital Signs & Weight: 


 Vital Signs (12 hours)











  Temp Pulse Resp BP Pulse Ox


 


 07/10/18 08:00  97.1 F L  85  16  136/91 H  93 L


 


 07/10/18 03:17  97.5 F L  85  17  116/71  93 L








 Weight











Weight                         197 lb 9.6 oz














I&O: 


 











 07/09/18 07/10/18 07/11/18





 06:59 06:59 06:59


 


Intake Total 4139 1384 


 


Balance 4139 1384 











Result Diagrams: 


 07/09/18 04:49





 07/09/18 04:49


Additional Labs: 


 Accuchecks











  07/10/18 07/09/18 07/09/18





  05:47 20:37 17:10


 


POC Glucose  129 H  203 H  151 H














Phys Exam





- Physical Examination


HEENT: PERRLA, moist MMs


Neck: no JVD, supple


Respiratory: no wheezing, no rales


Cardiovascular: RRR, no significant murmur


Gastrointestinal: soft, no distention, positive bowel sounds


Musculoskeletal: no edema, pulses present


Neurological: non-focal, moves all 4 limbs





Dx/Plan


(1) Acute encephalopathy


Code(s): G93.40 - ENCEPHALOPATHY, UNSPECIFIED   Status: Acute   Comment: sec to 

psychosis   





(2) Rhabdomyolysis


Code(s): M62.82 - RHABDOMYOLYSIS   Status: Acute   Comment: resoving   





(3) Sepsis


Code(s): A41.9 - SEPSIS, UNSPECIFIED ORGANISM   Status: Ruled-out   


Qualifiers: 


   Sepsis type: sepsis due to unspecified organism   Qualified Code(s): A41.9 - 

Sepsis, unspecified organism   





(4) Schizoaffective disorder


Code(s): F25.9 - SCHIZOAFFECTIVE DISORDER, UNSPECIFIED   Status: Chronic   


Qualifiers: 


   Schizoaffective disorder type: unspecified   Qualified Code(s): F25.9 - 

Schizoaffective disorder, unspecified   





(5) Diabetes type 2, controlled


Code(s): E11.9 - TYPE 2 DIABETES MELLITUS WITHOUT COMPLICATIONS   Status: 

Chronic   


Qualifiers: 


   Diabetes mellitus long term insulin use: without long term use   Diabetes 

mellitus complication status: with unspecified complications   Qualified Code(s)

: E11.8 - Type 2 diabetes mellitus with unspecified complications   


Comment: accuchecks, insulin sliding scale   





(6) Hypertension


Code(s): I10 - ESSENTIAL (PRIMARY) HYPERTENSION   Status: Chronic   


Qualifiers: 


   Hypertension type: essential hypertension   Qualified Code(s): I10 - 

Essential (primary) hypertension   


Comment: Stable


   





(7) Obesity (BMI 30-39.9)


Code(s): E66.9 - OBESITY, UNSPECIFIED   Status: Chronic   





(8) Afib


Code(s): I48.91 - UNSPECIFIED ATRIAL FIBRILLATION   Status: Chronic   


Qualifiers: 


   Atrial fibrillation type: chronic   Qualified Code(s): I48.2 - Chronic 

atrial fibrillation   





- Plan


hemostable


-: tx to medical floor


-: to do MRI brain for completion sake to r/o organic issue


-: needs inpt psychiatric unit, has no health insurance now


-: [facility] is working on placement





* .


started geodon from last night with cogentin.


may dc omnicef on discharge


is on cardizem CD 120mg bid for chronic afib/flutter which is rate controlled


Echo shows good ef.


Ambulated 180ft day before yesterday, to mobilize as tolerated, oob to chair 

etc.


sitter for h/o suicidal ideation prior to arrival.





Review of Systems





- Medications/Allergies


Allergies/Adverse Reactions: 


 Allergies











Allergy/AdvReac Type Severity Reaction Status Date / Time


 


No Known Drug Allergies Allergy   Verified 07/07/18 23:28











Medications: 


 Current Medications





Acetaminophen (Tylenol Er (8hr Arthritis Pain))  650 mg PO Q4H PRN


   PRN Reason: Pain


Aspirin (Aspirin)  325 mg PO DAILY [facility]


   Last Admin: 07/10/18 11:57 Dose:  Not Given


Benztropine Mesylate (Cogentin)  1 mg PO DAILY [facility]


   Last Admin: 07/10/18 11:57 Dose:  Not Given


Cefdinir (Omnicef)  300 mg PO BID [facility]


   Last Admin: 07/10/18 11:57 Dose:  Not Given


Clonidine (Catapres)  0.1 mg PO TID PRN


   PRN Reason: Hypertension


Clopidogrel Bisulfate (Plavix)  75 mg PO DAILY [facility]


   Last Admin: 07/10/18 11:58 Dose:  Not Given


Dextrose/Water (Dextrose 50%)  25 gm SLOW IVP PRN PRN


   PRN Reason: Hypoglycemia


Diltiazem HCl (Cardizem Cd)  120 mg PO BID [facility]


   Last Admin: 07/10/18 11:58 Dose:  Not Given


Divalproex Sodium (Depakote Er)  500 mg PO HS [facility]


   Last Admin: 07/09/18 20:27 Dose:  500 mg


Divalproex Sodium (Depakote Er)  250 mg PO DAILY [facility]


   Last Admin: 07/10/18 11:58 Dose:  Not Given


Docusate Sodium (Colace)  100 mg PO DAILY PRN


   PRN Reason: Constipation


Famotidine (Pepcid)  20 mg PO BID [facility]


   Last Admin: 07/10/18 11:58 Dose:  Not Given


Glucagon (Glucagon)  1 mg IM PRN PRN


   PRN Reason: Hypoglycemia


Dextrose/Water (D5w)  1,000 mls @ 0 mls/hr IV .Q0M PRN; As Directed


   PRN Reason: Hypoglycemia


Insulin Human Regular (Humulin R)  0 units SC .MILD SLIDING SCALE PRN


   PRN Reason: Mild Correctional Scale


   Last Admin: 07/08/18 13:20 Dose:  3 unit


Sodium Chloride (Flush - Normal Saline)  10 ml IVF Q12HR [facility]


   Last Admin: 07/10/18 09:15 Dose:  10 ml


Sodium Chloride (Flush - Normal Saline)  10 ml IVF PRN PRN


   PRN Reason: Saline Flush


Ziprasidone (Geodon)  20 mg PO HS [facility]


   Last Admin: 07/09/18 20:28 Dose:  20 mg

## 2018-07-11 NOTE — PDOC.PN
- Subjective


Encounter Start Date: 07/11/18


Encounter Start Time: 07:40





Pt seen for followup re: acute encephalopathy.  Pt not speaking, unable to 

complete ROS.





- Objective


MAR Reviewed: Yes


Vital Signs & Weight: 


 Vital Signs (12 hours)











  Temp Pulse Resp BP Pulse Ox


 


 07/11/18 09:01   99   


 


 07/11/18 08:01  99 F  99  16  156/103 H  95


 


 07/11/18 08:00  99 F  78  16   95


 


 07/11/18 04:00     144/90 H 








 Weight











Weight                         193 lb














I&O: 


 











 07/10/18 07/11/18 07/12/18





 06:59 06:59 06:59


 


Intake Total 1384  


 


Balance 1384  











Result Diagrams: 


 07/09/18 04:49





 07/11/18 05:09


Additional Labs: 


 Accuchecks











  07/11/18 07/11/18 07/11/18





  11:54 04:52 00:23


 


POC Glucose  135 H  125 H  112 H














  07/10/18 07/10/18 07/10/18





  19:18 16:20 10:47


 


POC Glucose  114 H  124 H  141 H








Labs reviewed by me





Phys Exam





- Physical Examination


Constitutional: NAD


HEENT: moist MMs, sclera anicteric, oral pharynx no lesions, 2+ tonsils


Neck: no nodes, no JVD, supple, full ROM


Respiratory: no wheezing, no rales, no rhonchi, clear to auscultation bilateral


Cardiovascular: RRR, no rub


S1, S2


Gastrointestinal: soft, non-tender, no distention, positive bowel sounds


Neurological: moves all 4 limbs


Psychiatric: normal affect


Deviation from normal: Unable to assess orientation to person, place or time





Dx/Plan


(1) Acute encephalopathy


Code(s): G93.40 - ENCEPHALOPATHY, UNSPECIFIED   Status: Acute   Comment: 

Improving   





(2) Rhabdomyolysis


Code(s): M62.82 - RHABDOMYOLYSIS   Status: Acute   Comment: CK improving   





(3) Bipolar disorder


Code(s): F31.9 - BIPOLAR DISORDER, UNSPECIFIED   Status: Chronic   Comment: 

await [facility] eval   





(4) Diabetes type 2, controlled


Code(s): E11.9 - TYPE 2 DIABETES MELLITUS WITHOUT COMPLICATIONS   Status: 

Chronic   


Qualifiers: 


   Diabetes mellitus long term insulin use: without long term use   Diabetes 

mellitus complication status: with unspecified complications   Qualified Code(s)

: E11.8 - Type 2 diabetes mellitus with unspecified complications   


Comment: continue accuchecks, insulin sliding scale   





(5) Hypertension


Code(s): I10 - ESSENTIAL (PRIMARY) HYPERTENSION   Status: Chronic   


Qualifiers: 


   Hypertension type: essential hypertension   Qualified Code(s): I10 - 

Essential (primary) hypertension   


Comment: controlled


   





- Plan





* .








Review of Systems





- Medications/Allergies


Allergies/Adverse Reactions: 


 Allergies











Allergy/AdvReac Type Severity Reaction Status Date / Time


 


No Known Drug Allergies Allergy   Verified 07/07/18 23:28











Medications: 


 Current Medications





Acetaminophen (Tylenol Er (8hr Arthritis Pain))  650 mg PO Q4H PRN


   PRN Reason: Pain


Aspirin (Aspirin)  325 mg PO DAILY [facility]


   Last Admin: 07/11/18 09:00 Dose:  Not Given


Benztropine Mesylate (Cogentin)  1 mg PO DAILY [facility]


   Last Admin: 07/11/18 09:01 Dose:  Not Given


Cefdinir (Omnicef)  300 mg PO BID [facility]


   Last Admin: 07/11/18 09:01 Dose:  Not Given


Clonidine (Catapres)  0.1 mg PO TID PRN


   PRN Reason: Hypertension


Clonidine (Catapres-Tts-1 Patch)  0.1 mg TD Q7D [facility]


   Last Admin: 07/11/18 10:15 Dose:  0.1 mg


Clopidogrel Bisulfate (Plavix)  75 mg PO DAILY [facility]


   Last Admin: 07/11/18 09:01 Dose:  Not Given


Dextrose/Water (Dextrose 50%)  25 gm SLOW IVP PRN PRN


   PRN Reason: Hypoglycemia


Diltiazem HCl (Cardizem Cd)  120 mg PO BID [facility]


   Last Admin: 07/11/18 09:01 Dose:  Not Given


Divalproex Sodium (Depakote Er)  500 mg PO HS [facility]


   Last Admin: 07/10/18 22:28 Dose:  Not Given


Divalproex Sodium (Depakote Er)  250 mg PO DAILY [facility]


   Last Admin: 07/11/18 09:02 Dose:  Not Given


Docusate Sodium (Colace)  100 mg PO DAILY PRN


   PRN Reason: Constipation


Famotidine (Pepcid)  20 mg PO BID [facility]


   Last Admin: 07/11/18 09:02 Dose:  Not Given


Glucagon (Glucagon)  1 mg IM PRN PRN


   PRN Reason: Hypoglycemia


Dextrose/Water (D5w)  1,000 mls @ 0 mls/hr IV .Q0M PRN; As Directed


   PRN Reason: Hypoglycemia


Insulin Human Regular (Humulin R)  0 units SC .MILD SLIDING SCALE PRN


   PRN Reason: Mild Correctional Scale


   Last Admin: 07/08/18 13:20 Dose:  3 unit


Sodium Chloride (Flush - Normal Saline)  10 ml IVF Q12HR [facility]


   Last Admin: 07/11/18 09:02 Dose:  10 ml


Sodium Chloride (Flush - Normal Saline)  10 ml IVF PRN PRN


   PRN Reason: Saline Flush


Ziprasidone (Geodon)  20 mg PO HS [facility]


   Last Admin: 07/10/18 22:28 Dose:  Not Given

## 2018-07-12 NOTE — PDOC.PN
- Subjective


Encounter Start Date: 07/12/18


Encounter Start Time: 10:40





Pt seen for followup re: acute encephalopathy.  Pt is nonverbal, unable to 

complete ROS.





- Objective


MAR Reviewed: Yes


Vital Signs & Weight: 


 Vital Signs (12 hours)











  Temp Pulse Resp BP BP BP Pulse Ox


 


 07/12/18 11:30  98.6 F  101 H  18    164/104 H  92 L


 


 07/12/18 08:14   80   145/94 H   


 


 07/12/18 08:02  97.8 F  80  20     95


 


 07/12/18 08:00  97.8 F  80  20   145/94 H   95


 


 07/12/18 03:53  98.5 F  96  20   147/99 H   92 L








 Weight











Weight                         191 lb














I&O: 


 











 07/11/18 07/12/18 07/13/18





 06:59 06:59 06:59


 


Intake Total  1200 


 


Balance  1200 











Result Diagrams: 


 07/09/18 04:49





 07/11/18 05:09


Additional Labs: 


 Accuchecks











  07/12/18 07/12/18 07/12/18





  10:45 08:22 06:19


 


POC Glucose  119 H  127 H  121 H














  07/11/18 07/11/18





  21:09 16:31


 


POC Glucose  118 H  125 H








Labs reviewed by me





Phys Exam





- Physical Examination


Constitutional: NAD


HEENT: moist MMs, sclera anicteric, oral pharynx no lesions, 2+ tonsils


Neck: no nodes, no JVD, supple, full ROM


Respiratory: no wheezing, no rales, no rhonchi, clear to auscultation bilateral


Cardiovascular: RRR, no rub


S1, S2


Gastrointestinal: soft, non-tender, no distention, positive bowel sounds


Neurological: moves all 4 limbs


Psychiatric: normal affect





Dx/Plan


(1) Acute encephalopathy


Code(s): G93.40 - ENCEPHALOPATHY, UNSPECIFIED   Status: Acute   Comment: 

Improving, continue Geodon   





(2) Rhabdomyolysis


Code(s): M62.82 - RHABDOMYOLYSIS   Status: Acute   Comment: Improving   





(3) Bipolar disorder


Code(s): F31.9 - BIPOLAR DISORDER, UNSPECIFIED   Status: Chronic   Comment: 

await [facility] eval   





(4) Diabetes type 2, controlled


Code(s): E11.9 - TYPE 2 DIABETES MELLITUS WITHOUT COMPLICATIONS   Status: 

Chronic   


Qualifiers: 


   Diabetes mellitus long term insulin use: without long term use   Diabetes 

mellitus complication status: with unspecified complications   Qualified Code(s)

: E11.8 - Type 2 diabetes mellitus with unspecified complications   


Comment: on accuchecks, insulin sliding scale   





(5) Hypertension


Code(s): I10 - ESSENTIAL (PRIMARY) HYPERTENSION   Status: Chronic   


Qualifiers: 


   Hypertension type: essential hypertension   Qualified Code(s): I10 - 

Essential (primary) hypertension   


Comment: controlled


   





- Plan





* .








Review of Systems





- Medications/Allergies


Allergies/Adverse Reactions: 


 Allergies











Allergy/AdvReac Type Severity Reaction Status Date / Time


 


No Known Drug Allergies Allergy   Verified 07/07/18 23:28











Medications: 


 Current Medications





Acetaminophen (Tylenol Er (8hr Arthritis Pain))  650 mg PO Q4H PRN


   PRN Reason: Pain


Aspirin (Aspirin)  300 mg [place] DAILY [facility]


Benztropine Mesylate (Cogentin)  1 mg PO DAILY [facility]


   Last Admin: 07/12/18 08:14 Dose:  Not Given


Cefdinir (Omnicef)  300 mg PO BID [facility]


   Last Admin: 07/12/18 08:13 Dose:  Not Given


Clonidine (Catapres)  0.1 mg PO TID PRN


   PRN Reason: Hypertension


Clonidine (Catapres-Tts-1 Patch)  0.1 mg TD Q7D [facility]


   Last Admin: 07/11/18 10:15 Dose:  0.1 mg


Clopidogrel Bisulfate (Plavix)  75 mg PO DAILY [facility]


   Last Admin: 07/12/18 08:15 Dose:  Not Given


Dextrose/Water (Dextrose 50%)  25 gm SLOW IVP PRN PRN


   PRN Reason: Hypoglycemia


Diltiazem HCl (Cardizem Cd)  120 mg PO BID [facility]


   Last Admin: 07/12/18 08:14 Dose:  Not Given


Docusate Sodium (Colace)  100 mg PO DAILY PRN


   PRN Reason: Constipation


Famotidine (Pepcid)  20 mg PO BID [facility]


   Last Admin: 07/12/18 08:14 Dose:  Not Given


Glucagon (Glucagon)  1 mg IM PRN PRN


   PRN Reason: Hypoglycemia


Dextrose/Water (D5w)  1,000 mls @ 0 mls/hr IV .Q0M PRN; As Directed


   PRN Reason: Hypoglycemia


Sodium Chloride (Normal Saline 0.9%)  1,000 mls @ 100 mls/hr IV .Q10H [facility]


   Last Admin: 07/12/18 12:52 Dose:  1,000 mls


Valproic Acid 250 mg/ Sodium (Chloride)  102.5 mls @ 205 mls/hr IVPB QAM [facility]


Valproic Acid 500 mg/ Sodium (Chloride)  105 mls @ 210 mls/hr IVPB HS [facility]


Insulin Human Regular (Humulin R)  0 units SC .MILD SLIDING SCALE PRN


   PRN Reason: Mild Correctional Scale


   Last Admin: 07/08/18 13:20 Dose:  3 unit


Sodium Chloride (Flush - Normal Saline)  10 ml IVF Q12HR [facility]


   Last Admin: 07/12/18 08:15 Dose:  10 ml


Sodium Chloride (Flush - Normal Saline)  10 ml IVF PRN PRN


   PRN Reason: Saline Flush


Ziprasidone (Geodon)  20 mg PO HS [facility]


   Last Admin: 07/11/18 20:03 Dose:  Not Given

## 2018-07-13 NOTE — PDOC.PN
- Subjective


Encounter Start Date: 07/13/18


Encounter Start Time: 12:00





Ptseen for followup re: acute encephalopathy.  Not answering questions, unable 

to complete ROS.  





- Objective


MAR Reviewed: Yes


Vital Signs & Weight: 


 Vital Signs (12 hours)











  Temp Pulse Resp BP BP BP Pulse Ox


 


 07/13/18 15:18  98.6 F  86  20   144/95 H   98


 


 07/13/18 12:19  97.9 F  85  20  144/92 H    95


 


 07/13/18 12:00  97.6 F  100  22 H  153/92 H    94 L


 


 07/13/18 08:24  98.8 F  91  20  163/103 H    95


 


 07/13/18 07:33  98.8 F  98  20    154/115 H  91 L








 Weight











Weight                         194 lb














I&O: 


 











 07/12/18 07/13/18 07/14/18





 06:59 06:59 06:59


 


Intake Total 1200 1100 1000


 


Balance 1200 1100 1000











Result Diagrams: 


 07/13/18 04:25





 07/13/18 04:25


Additional Labs: 


 Accuchecks











  07/13/18 07/13/18 07/12/18





  11:21 05:01 19:52


 


POC Glucose  142 H  144 H  129 H








Labs reviewed by me





Phys Exam





- Physical Examination


Obese


HEENT: moist MMs, sclera anicteric, oral pharynx no lesions, 2+ tonsils


Neck: no nodes, no JVD, supple, full ROM


Respiratory: clear to auscultation bilateral


Cardiovascular: RRR, no rub


S1, S2


Gastrointestinal: soft, non-tender, no distention, positive bowel sounds


Neurological: moves all 4 limbs


Deviation from normal: Unable to assess affect or orientation to person, place 

or time





Dx/Plan


(1) Acute encephalopathy


Code(s): G93.40 - ENCEPHALOPATHY, UNSPECIFIED   Status: Acute   Comment: 

continue Geodon   





(2) Bipolar disorder


Code(s): F31.9 - BIPOLAR DISORDER, UNSPECIFIED   Status: Chronic   Comment: 

await [facility] skip   





(3) Diabetes type 2, controlled


Code(s): E11.9 - TYPE 2 DIABETES MELLITUS WITHOUT COMPLICATIONS   Status: 

Chronic   


Qualifiers: 


   Diabetes mellitus long term insulin use: without long term use   Diabetes 

mellitus complication status: with unspecified complications   Qualified Code(s)

: E11.8 - Type 2 diabetes mellitus with unspecified complications   


Comment: on accuchecks, insulin sliding scale   





(4) Hypertension


Code(s): I10 - ESSENTIAL (PRIMARY) HYPERTENSION   Status: Chronic   


Qualifiers: 


   Hypertension type: essential hypertension   Qualified Code(s): I10 - 

Essential (primary) hypertension   


Comment: controlled


   





(5) Rhabdomyolysis


Code(s): M62.82 - RHABDOMYOLYSIS   Status: Resolved   





- Plan





* .








Review of Systems





- Medications/Allergies


Allergies/Adverse Reactions: 


 Allergies











Allergy/AdvReac Type Severity Reaction Status Date / Time


 


No Known Drug Allergies Allergy   Verified 07/07/18 23:28











Medications: 


 Current Medications





Acetaminophen (Tylenol Er (8hr Arthritis Pain))  650 mg PO Q4H PRN


   PRN Reason: Pain


Aspirin (Aspirin)  300 mg [place] DAILY [facility]


   Last Admin: 07/13/18 08:28 Dose:  300 mg


Benztropine Mesylate (Cogentin)  1 mg PO DAILY [facility]


   Last Admin: 07/13/18 08:28 Dose:  Not Given


Cefdinir (Omnicef)  300 mg PO BID [facility]


   Last Admin: 07/13/18 08:28 Dose:  Not Given


Clonidine (Catapres)  0.1 mg PO TID PRN


   PRN Reason: Hypertension


Clonidine (Catapres-Tts-1 Patch)  0.1 mg TD Q7D [facility]


   Last Admin: 07/11/18 10:15 Dose:  0.1 mg


Clopidogrel Bisulfate (Plavix)  75 mg PO DAILY [facility]


   Last Admin: 07/13/18 08:31 Dose:  Not Given


Dextrose/Water (Dextrose 50%)  25 gm SLOW IVP PRN PRN


   PRN Reason: Hypoglycemia


Diltiazem HCl (Cardizem Cd)  120 mg PO BID [facility]


   Last Admin: 07/13/18 08:30 Dose:  Not Given


Docusate Sodium (Colace)  100 mg PO DAILY PRN


   PRN Reason: Constipation


Famotidine (Pepcid)  20 mg PO BID [facility]


   Last Admin: 07/13/18 08:31 Dose:  Not Given


Glucagon (Glucagon)  1 mg IM PRN PRN


   PRN Reason: Hypoglycemia


Hydralazine HCl (Apresoline)  10 mg SLOW IVP Q6H PRN


   PRN Reason: SBP>170


Dextrose/Water (D5w)  1,000 mls @ 0 mls/hr IV .Q0M PRN; As Directed


   PRN Reason: Hypoglycemia


Valproic Acid 250 mg/ Sodium (Chloride)  102.5 mls @ 205 mls/hr IVPB QAM [facility]


   Last Admin: 07/13/18 08:27 Dose:  102.5 mls


Valproic Acid 500 mg/ Sodium (Chloride)  105 mls @ 210 mls/hr IVPB HS [facility]


   Last Admin: 07/12/18 20:53 Dose:  105 mls


Dextrose/Sodium Chloride (D5 0.9% Ns)  1,000 mls @ 70 mls/hr IV .[id number] [facility]


   Last Admin: 07/13/18 15:01 Dose:  1,000 mls


Insulin Human Regular (Humulin R)  0 units SC .MILD SLIDING SCALE PRN


   PRN Reason: Mild Correctional Scale


   Last Admin: 07/08/18 13:20 Dose:  3 unit


Sodium Chloride (Flush - Normal Saline)  10 ml IVF Q12HR [facility]


   Last Admin: 07/13/18 08:32 Dose:  Not Given


Sodium Chloride (Flush - Normal Saline)  10 ml IVF PRN PRN


   PRN Reason: Saline Flush


Ziprasidone (Geodon)  20 mg PO [facility]


   Last Admin: 07/12/18 20:50 Dose:  Not Given

## 2018-07-14 NOTE — PDOC.PN
- Subjective


Encounter Start Date: 07/14/18


Encounter Start Time: 07:40





- Objective


Vital Signs & Weight: 


 Vital Signs (12 hours)











  Temp Pulse Resp BP BP BP Pulse Ox


 


 07/14/18 11:49   76  18   152/105 H   97


 


 07/14/18 07:48   83   157/110 H   


 


 07/14/18 07:45  98.8 F  80  20     93 L


 


 07/14/18 07:15  98.8 F  83  18    160/120 H  93 L


 


 07/14/18 04:00  98.8 F  89  18   158/66 H   96








 Weight











Weight                         196 lb 9.6 oz














I&O: 


 











 07/13/18 07/14/18 07/15/18





 06:59 06:59 06:59


 


Intake Total 1100 1800 


 


Balance 1100 1800 











Result Diagrams: 


 07/14/18 04:01





 07/14/18 04:01


Additional Labs: 


 Accuchecks











  07/14/18 07/14/18 07/13/18





  11:51 04:35 19:42


 


POC Glucose  189 H  135 H  130 H














  07/13/18





  16:24


 


POC Glucose  132 H














Dx/Plan


(1) Acute encephalopathy


Code(s): G93.40 - ENCEPHALOPATHY, UNSPECIFIED   Status: Acute   Comment: 

continue Geodon   





(2) Bipolar disorder


Code(s): F31.9 - BIPOLAR DISORDER, UNSPECIFIED   Status: Chronic   Comment: 

await [facility] skip   





(3) Diabetes type 2, controlled


Code(s): E11.9 - TYPE 2 DIABETES MELLITUS WITHOUT COMPLICATIONS   Status: 

Chronic   


Qualifiers: 


   Diabetes mellitus long term insulin use: without long term use   Diabetes 

mellitus complication status: with unspecified complications   Qualified Code(s)

: E11.8 - Type 2 diabetes mellitus with unspecified complications   


Comment: on accuchecks, insulin sliding scale   





(4) Hypertension


Code(s): I10 - ESSENTIAL (PRIMARY) HYPERTENSION   Status: Chronic   


Qualifiers: 


   Hypertension type: essential hypertension   Qualified Code(s): I10 - 

Essential (primary) hypertension   


Comment: controlled


   





- Plan





* .

## 2018-07-14 NOTE — PDOC.PN
- Subjective


Encounter Start Date: 07/14/18


Encounter Start Time: 07:40





Pt seen for followup re: acute encephalopathy.  Opening eyes but not 

interacting.  Nonverbal, unable to complete ROS.





- Objective


MAR Reviewed: Yes


Vital Signs & Weight: 


 Vital Signs (12 hours)











  Temp Pulse Resp BP BP BP Pulse Ox


 


 07/14/18 11:49   76  18   152/105 H   97


 


 07/14/18 07:48   83   157/110 H   


 


 07/14/18 07:45  98.8 F  80  20     93 L


 


 07/14/18 07:15  98.8 F  83  18    160/120 H  93 L


 


 07/14/18 04:00  98.8 F  89  18   158/66 H   96








 Weight











Weight                         196 lb 9.6 oz














I&O: 


 











 07/13/18 07/14/18 07/15/18





 06:59 06:59 06:59


 


Intake Total 1100 1800 


 


Balance 1100 1800 











Result Diagrams: 


 07/14/18 04:01





 07/14/18 04:01


Additional Labs: 


 Accuchecks











  07/14/18 07/14/18 07/13/18





  11:51 04:35 19:42


 


POC Glucose  189 H  135 H  130 H














  07/13/18





  16:24


 


POC Glucose  132 H








labs reviewed by me





Phys Exam





- Physical Examination


Obesity


HEENT: moist MMs, sclera anicteric, oral pharynx no lesions, 2+ tonsils


Neck: no nodes, no JVD, supple, full ROM


Respiratory: no wheezing, no rales, no rhonchi, clear to auscultation bilateral


Cardiovascular: RRR, no rub


S1, S2


Gastrointestinal: soft, non-tender, no distention, positive bowel sounds


Neurological: moves all 4 limbs


Psychiatric: normal affect


Deviation from normal: Unable to assess orientation to person, place or time





Dx/Plan


(1) Acute encephalopathy


Code(s): G93.40 - ENCEPHALOPATHY, UNSPECIFIED   Status: Acute   Comment: 

Improving, continue Geodon   





(2) Bipolar disorder


Code(s): F31.9 - BIPOLAR DISORDER, UNSPECIFIED   Status: Chronic   Comment: 

awaiting [facility] eval   





(3) Diabetes type 2, controlled


Code(s): E11.9 - TYPE 2 DIABETES MELLITUS WITHOUT COMPLICATIONS   Status: 

Chronic   


Qualifiers: 


   Diabetes mellitus long term insulin use: without long term use   Diabetes 

mellitus complication status: with unspecified complications   Qualified Code(s)

: E11.8 - Type 2 diabetes mellitus with unspecified complications   


Comment: continue accuchecks, insulin sliding scale   





(4) Hypertension


Code(s): I10 - ESSENTIAL (PRIMARY) HYPERTENSION   Status: Chronic   


Qualifiers: 


   Hypertension type: essential hypertension   Qualified Code(s): I10 - 

Essential (primary) hypertension   


Comment: controlled


   





- Plan





* .








Review of Systems





- Medications/Allergies


Allergies/Adverse Reactions: 


 Allergies











Allergy/AdvReac Type Severity Reaction Status Date / Time


 


No Known Drug Allergies Allergy   Verified 07/07/18 23:28











Medications: 


 Current Medications





Acetaminophen (Tylenol Er (8hr Arthritis Pain))  650 mg PO Q4H PRN


   PRN Reason: Pain


   Last Admin: 07/14/18 10:34 Dose:  650 mg


Aspirin (Aspirin)  300 mg [place] [facility]ILY [facility]


   Last Admin: 07/14/18 07:30 Dose:  300 mg


Benztropine Mesylate (Cogentin)  1 mg PO DAILY [facility]


   Last Admin: 07/14/18 07:47 Dose:  Not Given


Clonidine (Catapres)  0.1 mg PO TID PRN


   PRN Reason: Hypertension


Clonidine (Catapres-Tts-1 Patch)  0.1 mg TD Q7D [facility]


   Last Admin: 07/11/18 10:15 Dose:  0.1 mg


Clopidogrel Bisulfate (Plavix)  75 mg PO DAILY [facility]


   Last Admin: 07/14/18 07:47 Dose:  Not Given


Dextrose/Water (Dextrose 50%)  25 gm SLOW IVP PRN PRN


   PRN Reason: Hypoglycemia


Diltiazem HCl (Cardizem Cd)  120 mg PO BID [facility]


   Last Admin: 07/14/18 07:48 Dose:  Not Given


Docusate Sodium (Colace)  100 mg PO DAILY PRN


   PRN Reason: Constipation


Famotidine (Pepcid)  20 mg PO BID [facility]


   Last Admin: 07/14/18 07:48 Dose:  Not Given


Glucagon (Glucagon)  1 mg IM PRN PRN


   PRN Reason: Hypoglycemia


Hydralazine HCl (Apresoline)  10 mg SLOW IVP Q6H PRN


   PRN Reason: SBP>170


Dextrose/Water (D5w)  1,000 mls @ 0 mls/hr IV .Q0M PRN; As Directed


   PRN Reason: Hypoglycemia


Valproic Acid 250 mg/ Sodium (Chloride)  102.5 mls @ 205 mls/hr IVPB QAM [facility]


   Last Admin: 07/14/18 07:45 Dose:  102.5 mls


Valproic Acid 500 mg/ Sodium (Chloride)  105 mls @ 210 mls/hr IVPB HS [facility]


   Last Admin: 07/13/18 20:24 Dose:  105 mls


Dextrose/Sodium Chloride (D5 0.9% Ns)  1,000 mls @ 70 mls/hr IV .[id number] [facility]


   Last Admin: 07/14/18 12:04 Dose:  Not Given


Insulin Human Regular (Humulin R)  0 units SC .MILD SLIDING SCALE PRN


   PRN Reason: Mild Correctional Scale


   Last Admin: 07/14/18 12:44 Dose:  2 unit


Labetalol HCl (Normodyne)  10 mg SLOW IVP Q8H PRN


   PRN Reason: SBP Greater Than 170


Sodium Chloride (Flush - Normal Saline)  10 ml IVF Q12HR [facility]


   Last Admin: 07/14/18 07:47 Dose:  10 ml


Sodium Chloride (Flush - Normal Saline)  10 ml IVF PRN PRN


   PRN Reason: Saline Flush


Ziprasidone (Geodon)  20 mg PO HS [facility]


   Last Admin: 07/13/18 20:28 Dose:  Not Given

## 2018-07-15 NOTE — PDOC.PN
- Subjective


Encounter Start Date: 07/15/18


Encounter Start Time: 08:40





Pt seen for followup re: acute encephalopathy.  Opening eyes but nonverbal, 

unable to complete ROS.





- Objective


MAR Reviewed: Yes


Vital Signs & Weight: 


 Vital Signs (12 hours)











  Temp Pulse Resp BP BP Pulse Ox


 


 07/15/18 12:23     153/104 H  


 


 07/15/18 08:12  98.2 F  73  18   157/99 H  95


 


 07/15/18 08:00  99.0 F  77  16    95


 


 07/15/18 04:30  99.0 F  77  16   144/101 H  97








 Weight











Weight                         200 lb 1.6 oz














I&O: 


 











 07/14/18 07/15/18 07/16/18





 06:59 06:59 06:59


 


Intake Total 1800 1520 


 


Balance 1800 1520 











Result Diagrams: 


 07/15/18 04:22





 07/15/18 04:22


Additional Labs: 


 Accuchecks











  07/15/18 07/15/18 07/14/18





  11:50 04:37 20:19


 


POC Glucose  140 H  130 H  109














  07/14/18





  17:01


 


POC Glucose  138 H








Labs reviewed by me





Phys Exam





- Physical Examination


Obesity


HEENT: moist MMs


Neck: supple


Respiratory: clear to auscultation bilateral


Cardiovascular: RRR


Gastrointestinal: soft


Musculoskeletal: no edema


Neurological: moves all 4 limbs


Deviation from normal: Unable to assess





Dx/Plan


(1) Acute encephalopathy


Code(s): G93.40 - ENCEPHALOPATHY, UNSPECIFIED   Status: Acute   Comment: 

Improving, continue Geodon, await [facility] bed   





(2) Bipolar disorder


Code(s): F31.9 - BIPOLAR DISORDER, UNSPECIFIED   Status: Chronic   Comment:  

[facility] following   





(3) Diabetes type 2, controlled


Code(s): E11.9 - TYPE 2 DIABETES MELLITUS WITHOUT COMPLICATIONS   Status: 

Chronic   


Qualifiers: 


   Diabetes mellitus long term insulin use: without long term use   Diabetes 

mellitus complication status: with unspecified complications   Qualified Code(s)

: E11.8 - Type 2 diabetes mellitus with unspecified complications   


Comment: on accuchecks, insulin sliding scale   





(4) Hypertension


Code(s): I10 - ESSENTIAL (PRIMARY) HYPERTENSION   Status: Chronic   


Qualifiers: 


   Hypertension type: essential hypertension   Qualified Code(s): I10 - 

Essential (primary) hypertension   


Comment: controlled


   





- Plan





* .








Review of Systems





- Medications/Allergies


Allergies/Adverse Reactions: 


 Allergies











Allergy/AdvReac Type Severity Reaction Status Date / Time


 


No Known Drug Allergies Allergy   Verified 07/07/18 23:28











Medications: 


 Current Medications





Acetaminophen (Tylenol Er (8hr Arthritis Pain))  650 mg PO Q4H PRN


   PRN Reason: Pain


   Last Admin: 07/14/18 10:34 Dose:  650 mg


Aspirin (Aspirin)  300 mg [place] DAILY [facility]


   Last Admin: 07/15/18 08:09 Dose:  300 mg


Benztropine Mesylate (Cogentin)  1 mg PO DAILY [facility]


   Last Admin: 07/15/18 08:18 Dose:  Not Given


Clonidine (Catapres)  0.1 mg PO TID PRN


   PRN Reason: Hypertension


Clonidine (Catapres-Tts-1 Patch)  0.1 mg TD Q7D [facility]


   Last Admin: 07/11/18 10:15 Dose:  0.1 mg


Clopidogrel Bisulfate (Plavix)  75 mg PO DAILY [facility]


   Last Admin: 07/15/18 08:18 Dose:  Not Given


Dextrose/Water (Dextrose 50%)  25 gm SLOW IVP PRN PRN


   PRN Reason: Hypoglycemia


Diltiazem HCl (Cardizem Cd)  120 mg PO BID [facility]


   Last Admin: 07/15/18 08:18 Dose:  Not Given


Docusate Sodium (Colace)  100 mg PO DAILY PRN


   PRN Reason: Constipation


Famotidine (Pepcid)  20 mg PO BID [facility]


   Last Admin: 07/15/18 08:17 Dose:  Not Given


Glucagon (Glucagon)  1 mg IM PRN PRN


   PRN Reason: Hypoglycemia


Hydralazine HCl (Apresoline)  10 mg SLOW IVP Q6H PRN


   PRN Reason: SBP>170


Dextrose/Water (D5w)  1,000 mls @ 0 mls/hr IV .Q0M PRN; As Directed


   PRN Reason: Hypoglycemia


Valproic Acid 250 mg/ Sodium (Chloride)  102.5 mls @ 205 mls/hr IVPB QAM [facility]


   Last Admin: 07/15/18 08:06 Dose:  102.5 mls


Valproic Acid 500 mg/ Sodium (Chloride)  105 mls @ 210 mls/hr IVPB HS [facility]


   Last Admin: 07/14/18 20:00 Dose:  105 mls


Dextrose/Sodium Chloride (D5 0.9% Ns)  1,000 mls @ 70 mls/hr IV .[id number] [facility]


   Last Admin: 07/15/18 01:54 Dose:  Not Given


Potassium Chloride 40 meq/ (Sodium Chloride)  520 mls @ 130 mls/hr IVPB NOW [facility]


   Stop: 07/15/18 15:59


   Last Admin: 07/15/18 12:32 Dose:  520 mls


Insulin Human Regular (Humulin R)  0 units SC .MILD SLIDING SCALE PRN


   PRN Reason: Mild Correctional Scale


   Last Admin: 07/14/18 12:44 Dose:  2 unit


Labetalol HCl (Normodyne)  10 mg SLOW IVP Q8H PRN


   PRN Reason: SBP Greater Than 170


Sodium Chloride (Flush - Normal Saline)  10 ml IVF Q12HR [facility]


   Last Admin: 07/15/18 08:19 Dose:  Not Given


Sodium Chloride (Flush - Normal Saline)  10 ml IVF PRN PRN


   PRN Reason: Saline Flush


Ziprasidone (Geodon)  20 mg PO HS [facility]


   Last Admin: 07/14/18 21:06 Dose:  Not Given

## 2018-07-16 NOTE — PDOC.PN
- Subjective


Encounter Start Date: 07/16/18


Encounter Start Time: 11:09


Subjective: nonverbal, mental status unchanged





- Objective


MAR Reviewed: Yes


Vital Signs & Weight: 


 Vital Signs (12 hours)











  Temp Pulse Resp BP BP BP Pulse Ox


 


 07/16/18 08:44   73   157/118 H   


 


 07/16/18 07:27  97.6 F  73  18    157/118 H  96


 


 07/16/18 04:00  98.2 F  81  18   162/98 H   97


 


 07/16/18 00:00  98.5 F  75  20   154/100 H   97








 Weight











Weight                         195 lb 9 oz














I&O: 


 











 07/15/18 07/16/18 07/17/18





 06:59 06:59 06:59


 


Intake Total 1520 1960 


 


Output Total  2 


 


Balance 1520 1958 











Result Diagrams: 


 07/15/18 04:22





 07/15/18 04:22


Additional Labs: 


 Accuchecks











  07/16/18 07/15/18 07/15/18





  05:09 21:01 16:41


 


POC Glucose  147 H  157 H  98














  07/15/18





  11:50


 


POC Glucose  140 H














Phys Exam





- Physical Examination


Neck: no JVD


Respiratory: clear to auscultation bilateral


Cardiovascular: RRR, no significant murmur


Gastrointestinal: soft, non-tender, positive bowel sounds


Musculoskeletal: no edema





Dx/Plan


(1) Acute encephalopathy


Code(s): G93.40 - ENCEPHALOPATHY, UNSPECIFIED   Status: Acute   Comment: 

Improving, continue Geodon, await YONY bed   





(2) Schizoaffective disorder


Code(s): F25.9 - SCHIZOAFFECTIVE DISORDER, UNSPECIFIED   Status: Chronic   


Qualifiers: 


   Schizoaffective disorder type: unspecified   Qualified Code(s): F25.9 - 

Schizoaffective disorder, unspecified   





(3) Diabetes type 2, controlled


Code(s): E11.9 - TYPE 2 DIABETES MELLITUS WITHOUT COMPLICATIONS   Status: 

Chronic   


Qualifiers: 


   Diabetes mellitus long term insulin use: without long term use   Diabetes 

mellitus complication status: with unspecified complications   Qualified Code(s)

: E11.8 - Type 2 diabetes mellitus with unspecified complications   


Comment: on accuchecks, insulin sliding scale   





(4) Hypertension


Code(s): I10 - ESSENTIAL (PRIMARY) HYPERTENSION   Status: Chronic   


Qualifiers: 


   Hypertension type: essential hypertension   Qualified Code(s): I10 - 

Essential (primary) hypertension   


Comment: controlled


   





(5) Paroxysmal atrial fibrillation


Code(s): I48.0 - PAROXYSMAL ATRIAL FIBRILLATION   Status: Chronic   Comment: 

Rate-controlled.   





- Plan


major problem other than mental status in uncontrolled HTN


-: add metoprolol 25 bid





* .

## 2018-07-17 NOTE — PDOC.PN
- Subjective


Encounter Start Date: 07/17/18


Encounter Start Time: 11:33


Subjective: no appropriate responce





- Objective


MAR Reviewed: Yes


Vital Signs & Weight: 


 Vital Signs (12 hours)











  Temp Pulse Resp BP Pulse Ox


 


 07/17/18 10:22   64   


 


 07/17/18 08:00  98.6 F  53 L  14  


 


 07/17/18 07:09  98.6 F  53 L  14  159/110 H  98








 Weight











Admit Weight                   199 lb 1.6 oz


 


Weight                         195 lb 9 oz














I&O: 


 











 07/16/18 07/17/18 07/18/18





 06:59 06:59 06:59


 


Intake Total 1960 360 


 


Output Total 2  


 


Balance 1958 360 











Result Diagrams: 


 07/15/18 04:22





 07/15/18 04:22


Additional Labs: 


 Accuchecks











  07/17/18 07/16/18 07/16/18





  05:47 20:28 16:55


 


POC Glucose  133 H  187 H  136 H














  07/16/18





  11:27


 


POC Glucose  132 H














Phys Exam





- Physical Examination


catatonic


Neck: no JVD


Respiratory: clear to auscultation bilateral


Cardiovascular: RRR, no significant murmur


Gastrointestinal: soft, non-tender, positive bowel sounds


Musculoskeletal: no edema





Dx/Plan


(1) Acute encephalopathy


Code(s): G93.40 - ENCEPHALOPATHY, UNSPECIFIED   Status: Acute   Comment: 

Improving, continue Geodon, await YONY bed   





(2) Schizoaffective disorder


Code(s): F25.9 - SCHIZOAFFECTIVE DISORDER, UNSPECIFIED   Status: Chronic   


Qualifiers: 


   Schizoaffective disorder type: unspecified   Qualified Code(s): F25.9 - 

Schizoaffective disorder, unspecified   





(3) Diabetes type 2, controlled


Code(s): E11.9 - TYPE 2 DIABETES MELLITUS WITHOUT COMPLICATIONS   Status: 

Chronic   


Qualifiers: 


   Diabetes mellitus long term insulin use: without long term use   Diabetes 

mellitus complication status: with unspecified complications   Qualified Code(s)

: E11.8 - Type 2 diabetes mellitus with unspecified complications   


Comment: on accuchecks, insulin sliding scale   





(4) Hypertension


Code(s): I10 - ESSENTIAL (PRIMARY) HYPERTENSION   Status: Chronic   


Qualifiers: 


   Hypertension type: essential hypertension   Qualified Code(s): I10 - 

Essential (primary) hypertension   


Comment: controlled


   





(5) Paroxysmal atrial fibrillation


Code(s): I48.0 - PAROXYSMAL ATRIAL FIBRILLATION   Status: Chronic   Comment: 

Rate-controlled.   





- Plan


awaiting placement


-: cont home meds


-: no medical complications at present





* .

## 2018-07-18 NOTE — PDOC.PN
- Subjective


Encounter Start Date: 07/18/18


Encounter Start Time: 11:02


Subjective: some verbalization today, adequate po intake





- Objective


MAR Reviewed: Yes


Vital Signs & Weight: 


 Vital Signs (12 hours)











  Temp Pulse Resp BP Pulse Ox


 


 07/18/18 08:00  97.5 F L  75  14  


 


 07/18/18 07:26  97.5 F L  75  14  146/98 H  95








 Weight











Admit Weight                   199 lb 1.6 oz


 


Weight                         195 lb 9 oz














I&O: 


 











 07/17/18 07/18/18 07/19/18





 06:59 06:59 06:59


 


Intake Total 360 1020 


 


Balance 360 1020 











Result Diagrams: 


 07/15/18 04:22





 07/15/18 04:22


Additional Labs: 


 Accuchecks











  07/17/18 07/17/18 07/17/18





  20:45 15:49 11:22


 


POC Glucose  115 H  189 H  132 H














Phys Exam





- Physical Examination


Neck: no JVD


Respiratory: clear to auscultation bilateral


Cardiovascular: RRR, no significant murmur


Gastrointestinal: soft, positive bowel sounds


Musculoskeletal: no edema





Dx/Plan


(1) Acute encephalopathy


Code(s): G93.40 - ENCEPHALOPATHY, UNSPECIFIED   Status: Acute   Comment: 

Improving, continue Geodon, await YONY bed   





(2) Schizoaffective disorder


Code(s): F25.9 - SCHIZOAFFECTIVE DISORDER, UNSPECIFIED   Status: Chronic   


Qualifiers: 


   Schizoaffective disorder type: unspecified   Qualified Code(s): F25.9 - 

Schizoaffective disorder, unspecified   





(3) Diabetes type 2, controlled


Code(s): E11.9 - TYPE 2 DIABETES MELLITUS WITHOUT COMPLICATIONS   Status: 

Chronic   


Qualifiers: 


   Diabetes mellitus long term insulin use: without long term use   Diabetes 

mellitus complication status: with unspecified complications   Qualified Code(s)

: E11.8 - Type 2 diabetes mellitus with unspecified complications   


Comment: on accuchecks, insulin sliding scale   





(4) Hypertension


Code(s): I10 - ESSENTIAL (PRIMARY) HYPERTENSION   Status: Chronic   


Qualifiers: 


   Hypertension type: essential hypertension   Qualified Code(s): I10 - 

Essential (primary) hypertension   


Comment: controlled


   





(5) Paroxysmal atrial fibrillation


Code(s): I48.0 - PAROXYSMAL ATRIAL FIBRILLATION   Status: Chronic   Comment: 

Rate-controlled.   





- Plan


still awaiting inpt psychont current meds and therapies





* .

## 2018-07-19 NOTE — PDOC.PN
- Subjective


Encounter Start Date: 07/19/18


Encounter Start Time: 12:08


Subjective: non-verbal





- Objective


MAR Reviewed: Yes


Vital Signs & Weight: 


 Vital Signs (12 hours)











  Temp Pulse Resp BP Pulse Ox


 


 07/19/18 11:45  97.6 F  72  16  149/95 H  97


 


 07/19/18 10:24   57 L   


 


 07/19/18 09:25  97.5 F L  57 L  16   97


 


 07/19/18 07:30  97.5 F L  57 L  16  155/96 H  95


 


 07/19/18 04:00  98.5 F  64  20  144/90 H  99








 Weight











Admit Weight                   199 lb 1.6 oz


 


Weight                         195 lb 9 oz














I&O: 


 











 07/18/18 07/19/18 07/20/18





 06:59 06:59 06:59


 


Intake Total 1020  


 


Output Total  250 


 


Balance 1020 -250 











Result Diagrams: 


 07/18/18 19:18





 07/18/18 19:18


Additional Labs: 


 Accuchecks











  07/19/18 07/19/18 07/18/18





  11:06 04:50 23:37


 


POC Glucose  131 H  114 H  114 H














  07/18/18 07/18/18 07/18/18





  19:28 16:05 04:38


 


POC Glucose  174 H  163 H  137 H














Phys Exam





- Physical Examination


Neck: no JVD


Respiratory: clear to auscultation bilateral


Cardiovascular: no significant murmur, irregular


Gastrointestinal: soft, positive bowel sounds


Musculoskeletal: no edema





Dx/Plan


(1) Acute encephalopathy


Code(s): G93.40 - ENCEPHALOPATHY, UNSPECIFIED   Status: Acute   Comment: 

Improving, continue Geodon, await YONY bed   





(2) Schizoaffective disorder


Code(s): F25.9 - SCHIZOAFFECTIVE DISORDER, UNSPECIFIED   Status: Chronic   


Qualifiers: 


   Schizoaffective disorder type: unspecified   Qualified Code(s): F25.9 - 

Schizoaffective disorder, unspecified   





(3) Diabetes type 2, controlled


Code(s): E11.9 - TYPE 2 DIABETES MELLITUS WITHOUT COMPLICATIONS   Status: 

Chronic   


Qualifiers: 


   Diabetes mellitus long term insulin use: without long term use   Diabetes 

mellitus complication status: with unspecified complications   Qualified Code(s)

: E11.8 - Type 2 diabetes mellitus with unspecified complications   


Comment: on accuchecks, insulin sliding scale   





(4) Hypertension


Code(s): I10 - ESSENTIAL (PRIMARY) HYPERTENSION   Status: Chronic   


Qualifiers: 


   Hypertension type: essential hypertension   Qualified Code(s): I10 - 

Essential (primary) hypertension   


Comment: controlled


   





(5) Paroxysmal atrial fibrillation


Code(s): I48.0 - PAROXYSMAL ATRIAL FIBRILLATION   Status: Chronic   Comment: 

Rate-controlled.   





- Plan


medically stable, awaiting YONY





* .

## 2018-07-19 NOTE — DIS
PRIMARY CARE PROVIDER: Dr. Gloria Colorado 

 

DATE OF ADMISSION:  07/07/2018

 

DATE OF DISCHARGE:  07/19/2018

 

DISCHARGE DISPOSITION:  Discharged to [facility].

 

FINAL DIAGNOSES:

1.  Encephalopathy.  

2.  Schizoaffective disorder.

3.  Diabetes mellitus type 2.

4.  Hypertension.

5.  Atrial fibrillation.

 

DISCHARGE MEDICATIONS:  Clonidine 0.1 mg p.o. t.i.d. p.r.n., clonidine TTS 1 patch change every 7 day
s, Geodon 20 mg at bedtime, metoprolol 25 mg twice a day, Depakote 250 a.m., 500 p.m., diltiazem 120 
mg p.o. b.i.d., Plavix 75 mg a day, Cogentin 1 mg a day, Tylenol Arthritis Pain.

 

ALLERGIES:  No known allergies.

 

CODE STATUS:  FULL.

 

PENDING AT THE TIME OF DISCHARGE:  Nothing.

 

CONSULTATIONS:  Dr. Nydia Sethi

 

PROCEDURES:  Lumbar puncture.

 

HOSPITAL COURSE:  The patient admitted to [facility] through the [facility] with history of schizophrenia.  The patient has been confused, noncooperative.  He had been brou
ght to the emergency room for mental status changes.  He has had homicidal and suicidal ideations.  

 

Physical exam was done revealing initial CBC showed a mild elevation of white count of 13.1, hemoglob
in 16.9, platelet count 305,000.  This was repeated frequently.  The white cell count cleared up on d
ay 2.  His discharge CBC is normal.  Chemistries; admitting creatinine 1.61, BUN 45, creatinine kinas
e 1284, bilirubin 3.3, AST 49.  Admitting lactic acid was 2.3.  His rhabdomyolysis cleared rapidly.  
His lactic acidosis cleared rapidly.  His blood sugar remained well controlled during his hospital st
ay, his hemoglobin A1c is 6.2.  His last electrolytes revealed a sodium 138, potassium 3.4, BUN 15, c
reatinine 1.08, bilirubin still minimally elevated at 1.3, most consistent with either [place] or Ro
tor syndrome, benign liver variant.  His EKG x2 revealed atrial fibrillation, controlled ventricular 
response.  He had a LP which revealed 1 red cell, 2 white cells, colorless clear, CSF culture was neg
ative.  Urine culture was negative.  Blood cultures negative x2.  

 

The patient has been in the hospital some 12 days.  He has been cleared for transfer for over a week.
  He has now been accepted at the [facility].  He has been transferred there.  Currently, 
his mental status remains the same.  He is noncommunicative.  He does feed himself.  He does move and
 do things, but he is not cooperative on any level.  As mentioned before, studies were done to find a
 reason for his change in mental status, encephalopathy other than his schizoaffective disorder, none
 was found.

 

FOLLOWUP:  Follow up will be at the [facility].  I have spoken with the admitting physicia
n within the last hour and they have agreed to accept the patient who is medically stable.